# Patient Record
Sex: FEMALE | Race: WHITE | Employment: FULL TIME | URBAN - METROPOLITAN AREA
[De-identification: names, ages, dates, MRNs, and addresses within clinical notes are randomized per-mention and may not be internally consistent; named-entity substitution may affect disease eponyms.]

---

## 2017-03-15 PROBLEM — G89.29 CHRONIC BILATERAL LOW BACK PAIN: Status: ACTIVE | Noted: 2017-03-15

## 2017-03-15 PROBLEM — M54.50 CHRONIC BILATERAL LOW BACK PAIN: Status: ACTIVE | Noted: 2017-03-15

## 2017-03-15 PROBLEM — M54.9 DORSAL PAIN: Status: ACTIVE | Noted: 2017-03-15

## 2017-03-17 PROBLEM — N20.1 URETEROLITHIASIS: Status: ACTIVE | Noted: 2017-03-17

## 2017-03-17 PROBLEM — R10.9 ACUTE RIGHT FLANK PAIN: Status: ACTIVE | Noted: 2017-03-17

## 2017-09-11 PROBLEM — R51.9 NEW ONSET HEADACHE: Status: ACTIVE | Noted: 2017-09-11

## 2018-07-02 DIAGNOSIS — F51.01 PRIMARY INSOMNIA: ICD-10-CM

## 2018-07-02 RX ORDER — ZOLPIDEM TARTRATE 10 MG/1
10 TABLET ORAL NIGHTLY PRN
Qty: 30 TABLET | Refills: 0 | Status: SHIPPED | OUTPATIENT
Start: 2018-07-02 | End: 2018-08-13 | Stop reason: SDUPTHER

## 2018-08-13 ENCOUNTER — OFFICE VISIT (OUTPATIENT)
Dept: FAMILY MEDICINE CLINIC | Age: 38
End: 2018-08-13
Payer: COMMERCIAL

## 2018-08-13 VITALS
HEIGHT: 61 IN | HEART RATE: 86 BPM | DIASTOLIC BLOOD PRESSURE: 78 MMHG | RESPIRATION RATE: 16 BRPM | WEIGHT: 172 LBS | SYSTOLIC BLOOD PRESSURE: 120 MMHG | OXYGEN SATURATION: 99 % | BODY MASS INDEX: 32.47 KG/M2 | TEMPERATURE: 98 F

## 2018-08-13 DIAGNOSIS — F51.01 PRIMARY INSOMNIA: ICD-10-CM

## 2018-08-13 PROCEDURE — G8417 CALC BMI ABV UP PARAM F/U: HCPCS | Performed by: NURSE PRACTITIONER

## 2018-08-13 PROCEDURE — 99213 OFFICE O/P EST LOW 20 MIN: CPT | Performed by: NURSE PRACTITIONER

## 2018-08-13 PROCEDURE — 1036F TOBACCO NON-USER: CPT | Performed by: NURSE PRACTITIONER

## 2018-08-13 PROCEDURE — G8427 DOCREV CUR MEDS BY ELIG CLIN: HCPCS | Performed by: NURSE PRACTITIONER

## 2018-08-13 RX ORDER — ZOLPIDEM TARTRATE 10 MG/1
10 TABLET ORAL NIGHTLY PRN
Qty: 30 TABLET | Refills: 0 | Status: SHIPPED | OUTPATIENT
Start: 2018-08-13 | End: 2018-08-13 | Stop reason: CLARIF

## 2018-08-13 RX ORDER — ZOLPIDEM TARTRATE 10 MG/1
10 TABLET ORAL NIGHTLY PRN
Qty: 30 TABLET | Refills: 0 | Status: SHIPPED | OUTPATIENT
Start: 2018-08-13 | End: 2018-10-04 | Stop reason: SDUPTHER

## 2018-08-13 ASSESSMENT — ENCOUNTER SYMPTOMS
DIARRHEA: 0
COUGH: 0
SHORTNESS OF BREATH: 0
NAUSEA: 0
CONSTIPATION: 1
VOMITING: 0
WHEEZING: 0

## 2018-08-13 NOTE — PROGRESS NOTES
Psychiatric: She has a normal mood and affect. Her behavior is normal.         Assessment/Plan:  Lorenzo Pierce was seen today for insomnia. Diagnoses and all orders for this visit:    Primary insomnia  -     Discontinue: zolpidem (AMBIEN) 10 MG tablet; Take 1 tablet by mouth nightly as needed for Sleep. .  -     zolpidem (AMBIEN) 10 MG tablet; Take 1 tablet by mouth nightly as needed for Sleep. .    -add melatonin to try to maintain sleep. If not effective, call office and alternative will be ordered      -  Controlled Substances Monitoring:     RX Monitoring 8/13/2018   Attestation The Prescription Monitoring Report for this patient was reviewed today. Documentation No signs of potential drug abuse or diversion identified.        Greater than 15  Minutes was spent with patient and more than 50% of the time was spent face to face counseling and educating regarding diagnoses

## 2018-09-02 ENCOUNTER — HOSPITAL ENCOUNTER (EMERGENCY)
Age: 38
Discharge: HOME | End: 2018-09-02
Payer: COMMERCIAL

## 2018-09-02 VITALS
WEIGHT: 170 LBS | SYSTOLIC BLOOD PRESSURE: 111 MMHG | RESPIRATION RATE: 16 BRPM | DIASTOLIC BLOOD PRESSURE: 75 MMHG | OXYGEN SATURATION: 96 % | TEMPERATURE: 98.6 F | BODY MASS INDEX: 32.12 KG/M2 | HEART RATE: 77 BPM

## 2018-09-02 DIAGNOSIS — L23.7 POISON IVY: Primary | ICD-10-CM

## 2018-09-02 PROCEDURE — 96372 THER/PROPH/DIAG INJ SC/IM: CPT

## 2018-09-02 PROCEDURE — 99212 OFFICE O/P EST SF 10 MIN: CPT

## 2018-09-02 PROCEDURE — 6360000002 HC RX W HCPCS: Performed by: NURSE PRACTITIONER

## 2018-09-02 RX ORDER — DEXAMETHASONE SODIUM PHOSPHATE 10 MG/ML
10 INJECTION, SOLUTION INTRAMUSCULAR; INTRAVENOUS ONCE
Status: COMPLETED | OUTPATIENT
Start: 2018-09-02 | End: 2018-09-02

## 2018-09-02 RX ORDER — METHYLPREDNISOLONE 4 MG/1
TABLET ORAL
Qty: 21 TABLET | Status: SHIPPED | OUTPATIENT
Start: 2018-09-02 | End: 2018-09-08

## 2018-09-02 RX ADMIN — DEXAMETHASONE SODIUM PHOSPHATE 10 MG: 10 INJECTION INTRAMUSCULAR; INTRAVENOUS at 09:15

## 2018-09-05 ENCOUNTER — CARE COORDINATION (OUTPATIENT)
Dept: CARE COORDINATION | Age: 38
End: 2018-09-05

## 2018-09-22 ENCOUNTER — HOSPITAL ENCOUNTER (EMERGENCY)
Age: 38
Discharge: HOME | End: 2018-09-22
Payer: COMMERCIAL

## 2018-09-22 ENCOUNTER — APPOINTMENT (OUTPATIENT)
Dept: CT IMAGING | Age: 38
End: 2018-09-22
Payer: COMMERCIAL

## 2018-09-22 VITALS
TEMPERATURE: 98.6 F | HEART RATE: 84 BPM | WEIGHT: 170 LBS | SYSTOLIC BLOOD PRESSURE: 113 MMHG | BODY MASS INDEX: 32.12 KG/M2 | OXYGEN SATURATION: 99 % | DIASTOLIC BLOOD PRESSURE: 72 MMHG | RESPIRATION RATE: 16 BRPM

## 2018-09-22 VITALS
HEART RATE: 71 BPM | HEIGHT: 61 IN | OXYGEN SATURATION: 98 % | WEIGHT: 165 LBS | DIASTOLIC BLOOD PRESSURE: 86 MMHG | TEMPERATURE: 97.9 F | SYSTOLIC BLOOD PRESSURE: 132 MMHG | RESPIRATION RATE: 17 BRPM | BODY MASS INDEX: 31.15 KG/M2

## 2018-09-22 DIAGNOSIS — R10.9 FLANK PAIN: Primary | ICD-10-CM

## 2018-09-22 DIAGNOSIS — M54.50 ACUTE RIGHT-SIDED LOW BACK PAIN WITHOUT SCIATICA: Primary | ICD-10-CM

## 2018-09-22 LAB
ANION GAP SERPL CALCULATED.3IONS-SCNC: 11 MMOL/L (ref 7–16)
BASOPHILS ABSOLUTE: 0.02 E9/L (ref 0–0.2)
BASOPHILS RELATIVE PERCENT: 0.3 % (ref 0–2)
BILIRUBIN URINE: NEGATIVE
BLOOD, URINE: NEGATIVE
BUN BLDV-MCNC: 9 MG/DL (ref 6–20)
CALCIUM SERPL-MCNC: 9.3 MG/DL (ref 8.6–10.2)
CHLORIDE BLD-SCNC: 104 MMOL/L (ref 98–107)
CLARITY: CLEAR
CO2: 28 MMOL/L (ref 22–29)
COLOR: YELLOW
CREAT SERPL-MCNC: 0.8 MG/DL (ref 0.5–1)
EOSINOPHILS ABSOLUTE: 0.14 E9/L (ref 0.05–0.5)
EOSINOPHILS RELATIVE PERCENT: 1.8 % (ref 0–6)
GFR AFRICAN AMERICAN: >60
GFR NON-AFRICAN AMERICAN: >60 ML/MIN/1.73
GLUCOSE BLD-MCNC: 108 MG/DL (ref 74–109)
GLUCOSE URINE: NEGATIVE MG/DL
HCG(URINE) PREGNANCY TEST: NEGATIVE
HCT VFR BLD CALC: 41.2 % (ref 34–48)
HEMOGLOBIN: 13.6 G/DL (ref 11.5–15.5)
IMMATURE GRANULOCYTES #: 0.03 E9/L
IMMATURE GRANULOCYTES %: 0.4 % (ref 0–5)
KETONES, URINE: NEGATIVE MG/DL
LACTIC ACID: 1 MMOL/L (ref 0.5–2.2)
LEUKOCYTE ESTERASE, URINE: NEGATIVE
LYMPHOCYTES ABSOLUTE: 2.58 E9/L (ref 1.5–4)
LYMPHOCYTES RELATIVE PERCENT: 33.7 % (ref 20–42)
MCH RBC QN AUTO: 30.8 PG (ref 26–35)
MCHC RBC AUTO-ENTMCNC: 33 % (ref 32–34.5)
MCV RBC AUTO: 93.4 FL (ref 80–99.9)
MONOCYTES ABSOLUTE: 0.55 E9/L (ref 0.1–0.95)
MONOCYTES RELATIVE PERCENT: 7.2 % (ref 2–12)
NEUTROPHILS ABSOLUTE: 4.33 E9/L (ref 1.8–7.3)
NEUTROPHILS RELATIVE PERCENT: 56.6 % (ref 43–80)
NITRITE, URINE: NEGATIVE
PDW BLD-RTO: 12.9 FL (ref 11.5–15)
PH UA: 6.5 (ref 5–9)
PLATELET # BLD: 253 E9/L (ref 130–450)
PMV BLD AUTO: 10.3 FL (ref 7–12)
POTASSIUM SERPL-SCNC: 4 MMOL/L (ref 3.5–5)
PROTEIN UA: NEGATIVE MG/DL
RBC # BLD: 4.41 E12/L (ref 3.5–5.5)
SODIUM BLD-SCNC: 143 MMOL/L (ref 132–146)
SPECIFIC GRAVITY UA: 1.02 (ref 1–1.03)
UROBILINOGEN, URINE: 0.2 E.U./DL
WBC # BLD: 7.7 E9/L (ref 4.5–11.5)

## 2018-09-22 PROCEDURE — 99212 OFFICE O/P EST SF 10 MIN: CPT

## 2018-09-22 PROCEDURE — 99284 EMERGENCY DEPT VISIT MOD MDM: CPT

## 2018-09-22 PROCEDURE — 83605 ASSAY OF LACTIC ACID: CPT

## 2018-09-22 PROCEDURE — 2580000003 HC RX 258: Performed by: PHYSICIAN ASSISTANT

## 2018-09-22 PROCEDURE — 81003 URINALYSIS AUTO W/O SCOPE: CPT

## 2018-09-22 PROCEDURE — 87088 URINE BACTERIA CULTURE: CPT

## 2018-09-22 PROCEDURE — 6360000002 HC RX W HCPCS: Performed by: PHYSICIAN ASSISTANT

## 2018-09-22 PROCEDURE — 96374 THER/PROPH/DIAG INJ IV PUSH: CPT

## 2018-09-22 PROCEDURE — 74176 CT ABD & PELVIS W/O CONTRAST: CPT

## 2018-09-22 PROCEDURE — 80048 BASIC METABOLIC PNL TOTAL CA: CPT

## 2018-09-22 PROCEDURE — 81025 URINE PREGNANCY TEST: CPT

## 2018-09-22 PROCEDURE — 96375 TX/PRO/DX INJ NEW DRUG ADDON: CPT

## 2018-09-22 PROCEDURE — 85025 COMPLETE CBC W/AUTO DIFF WBC: CPT

## 2018-09-22 PROCEDURE — 36415 COLL VENOUS BLD VENIPUNCTURE: CPT

## 2018-09-22 RX ORDER — 0.9 % SODIUM CHLORIDE 0.9 %
1000 INTRAVENOUS SOLUTION INTRAVENOUS ONCE
Status: COMPLETED | OUTPATIENT
Start: 2018-09-22 | End: 2018-09-22

## 2018-09-22 RX ORDER — NAPROXEN 500 MG/1
500 TABLET ORAL 2 TIMES DAILY
Qty: 14 TABLET | Refills: 0 | Status: SHIPPED | OUTPATIENT
Start: 2018-09-22 | End: 2019-01-11 | Stop reason: ALTCHOICE

## 2018-09-22 RX ORDER — KETOROLAC TROMETHAMINE 30 MG/ML
30 INJECTION, SOLUTION INTRAMUSCULAR; INTRAVENOUS ONCE
Status: COMPLETED | OUTPATIENT
Start: 2018-09-22 | End: 2018-09-22

## 2018-09-22 RX ORDER — METHYLPREDNISOLONE 4 MG/1
TABLET ORAL
Qty: 21 TABLET | Status: SHIPPED | OUTPATIENT
Start: 2018-09-22 | End: 2018-09-28

## 2018-09-22 RX ORDER — CYCLOBENZAPRINE HCL 10 MG
10 TABLET ORAL 3 TIMES DAILY PRN
Qty: 21 TABLET | Refills: 0 | Status: SHIPPED | OUTPATIENT
Start: 2018-09-22 | End: 2018-10-02

## 2018-09-22 RX ORDER — ONDANSETRON 2 MG/ML
4 INJECTION INTRAMUSCULAR; INTRAVENOUS ONCE
Status: COMPLETED | OUTPATIENT
Start: 2018-09-22 | End: 2018-09-22

## 2018-09-22 RX ADMIN — ONDANSETRON HYDROCHLORIDE 4 MG: 2 INJECTION, SOLUTION INTRAMUSCULAR; INTRAVENOUS at 21:54

## 2018-09-22 RX ADMIN — SODIUM CHLORIDE 1000 ML: 9 INJECTION, SOLUTION INTRAVENOUS at 21:54

## 2018-09-22 RX ADMIN — KETOROLAC TROMETHAMINE 30 MG: 30 INJECTION, SOLUTION INTRAMUSCULAR; INTRAVENOUS at 21:54

## 2018-09-22 ASSESSMENT — PAIN DESCRIPTION - PAIN TYPE: TYPE: CHRONIC PAIN;ACUTE PAIN

## 2018-09-22 ASSESSMENT — PAIN DESCRIPTION - ORIENTATION: ORIENTATION: RIGHT

## 2018-09-22 ASSESSMENT — PAIN SCALES - GENERAL
PAINLEVEL_OUTOF10: 7

## 2018-09-22 ASSESSMENT — PAIN DESCRIPTION - LOCATION: LOCATION: BACK

## 2018-09-22 ASSESSMENT — PAIN DESCRIPTION - DESCRIPTORS: DESCRIPTORS: ACHING

## 2018-09-23 NOTE — ED PROVIDER NOTES
Patient very uncomfortable here we spoke about options of what to do. Patient looking for more definitive answer answer to her problem Patient  decided go to the ER for further evaluation of this problem she'll go by private vehicle. We will do a urine here but ultimately she needs worked up for a possible kidney stone.    --------------------------------------------- PAST HISTORY ---------------------------------------------  Past Medical History:  has a past medical history of Anxiety; Kidney stone; and New onset headache. Past Surgical History:  has a past surgical history that includes Tonsillectomy; Cystoscopy (12/12/2016); and Lithotripsy. Social History:  reports that she has never smoked. She has never used smokeless tobacco. She reports that she does not drink alcohol or use drugs. Family History: family history is not on file. The patients home medications have been reviewed. Allergies: Codeine    -------------------------------------------------- RESULTS -------------------------------------------------  No results found for this visit on 09/22/18. No orders to display       ------------------------- NURSING NOTES AND VITALS REVIEWED ---------------------------   The nursing notes within the ED encounter and vital signs as below have been reviewed. /72   Pulse 84   Temp 98.6 °F (37 °C)   Resp 16   Wt 170 lb (77.1 kg)   LMP 09/06/2018   SpO2 99%   BMI 32.12 kg/m²   Oxygen Saturation Interpretation: Normal      ------------------------------------------ PROGRESS NOTES ------------------------------------------   I have spoken with the patient and discussed todays results, in addition to providing specific details for the plan of care and counseling regarding the diagnosis and prognosis.   Their questions are answered at this time and they are agreeable with the plan.      --------------------------------- ADDITIONAL PROVIDER NOTES ---------------------------------      This patient is stable for discharge. I have shared the specific conditions for return, as well as the importance of follow-up. * NOTE: This report was transcribed using voice recognition software. Every effort was made to ensure accuracy; however, inadvertent computerized transcription errors may be present.    --------------------------------- IMPRESSION AND DISPOSITION ---------------------------------    IMPRESSION  1.  Flank pain        DISPOSITION  Disposition: Discharge to emergency department  Patient condition is stable         Cindy Pro PA-C  09/22/18 2021

## 2018-09-23 NOTE — ED PROVIDER NOTES
sounds. Distension:  None. Tenderness: No abdominal tenderness. Liver: non-tender. Spleen:  non-tender. Pulsatile Mass: absent. Back: CVA Tenderness: Yes, Right. Integument:  Normal turgor. Warm, dry, without visible rash, unless noted elsewhere. Lymphatics: No lymphangitis or adenopathy noted. Neurological:  Oriented. Motor functions intact. Lab / Imaging Results   (All laboratory and radiology results have been personally reviewed by myself)  Labs:  Results for orders placed or performed during the hospital encounter of 09/22/18   CBC Auto Differential   Result Value Ref Range    WBC 7.7 4.5 - 11.5 E9/L    RBC 4.41 3.50 - 5.50 E12/L    Hemoglobin 13.6 11.5 - 15.5 g/dL    Hematocrit 41.2 34.0 - 48.0 %    MCV 93.4 80.0 - 99.9 fL    MCH 30.8 26.0 - 35.0 pg    MCHC 33.0 32.0 - 34.5 %    RDW 12.9 11.5 - 15.0 fL    Platelets 598 930 - 720 E9/L    MPV 10.3 7.0 - 12.0 fL    Neutrophils % 56.6 43.0 - 80.0 %    Immature Granulocytes % 0.4 0.0 - 5.0 %    Lymphocytes % 33.7 20.0 - 42.0 %    Monocytes % 7.2 2.0 - 12.0 %    Eosinophils % 1.8 0.0 - 6.0 %    Basophils % 0.3 0.0 - 2.0 %    Neutrophils # 4.33 1.80 - 7.30 E9/L    Immature Granulocytes # 0.03 E9/L    Lymphocytes # 2.58 1.50 - 4.00 E9/L    Monocytes # 0.55 0.10 - 0.95 E9/L    Eosinophils # 0.14 0.05 - 0.50 E9/L    Basophils # 0.02 0.00 - 0.20 Q0/G   Basic Metabolic Panel   Result Value Ref Range    Sodium 143 132 - 146 mmol/L    Potassium 4.0 3.5 - 5.0 mmol/L    Chloride 104 98 - 107 mmol/L    CO2 28 22 - 29 mmol/L    Anion Gap 11 7 - 16 mmol/L    Glucose 108 74 - 109 mg/dL    BUN 9 6 - 20 mg/dL    CREATININE 0.8 0.5 - 1.0 mg/dL    GFR Non-African American >60 >=60 mL/min/1.73    GFR African American >60     Calcium 9.3 8.6 - 10.2 mg/dL   Lactic Acid, Plasma   Result Value Ref Range    Lactic Acid 1.0 0.5 - 2.2 mmol/L     Imaging:   All Radiology results interpreted by Radiologist unless otherwise noted.  CT ABDOMEN PELVIS WO CONTRAST Additional Contrast? None   Final Result   1. There are no findings of obstructive uropathy. 2. Unremarkable unenhanced CT scan of the abdomen. Specifically, there   is no appendicitis or acute cholecystitis. ED Course / Medical Decision Making     Medications   0.9 % sodium chloride bolus (1,000 mLs Intravenous New Bag 9/22/18 2154)   ondansetron (ZOFRAN) injection 4 mg (4 mg Intravenous Given 9/22/18 2154)   ketorolac (TORADOL) injection 30 mg (30 mg Intravenous Given 9/22/18 2154)      Re-examination:  9/22/18       Time: 2230   Patient's symptoms are improving. Consult(s):   None    Procedure(s):   none. MDM:   Patient presents to the emergency department for right-sided low back pain with concerns of kidney stone. Her diagnostic workup is completely unremarkable for acute abnormalities. Vital signs stable. She is well appearing during my reexamination and appropriate for discharge and outpatient follow-up. Specific conditions for emergent return have been discussed and the patient verbalized understanding to return immediately for new or worsening symptoms. Counseling: The emergency provider has spoken with the patient and discussed todays results, in addition to providing specific details for the plan of care and counseling regarding the diagnosis and prognosis. Questions are answered at this time and they are agreeable with the plan. Assessment      1.  Acute right-sided low back pain without sciatica       Plan   Discharge to home  Patient condition is good    New Medications     New Prescriptions    CYCLOBENZAPRINE (FLEXERIL) 10 MG TABLET    Take 1 tablet by mouth 3 times daily as needed for Muscle spasms    METHYLPREDNISOLONE (MEDROL, AMOR,) 4 MG TABLET    Take as directed on package insert days 1-6    NAPROXEN (NAPROSYN) 500 MG TABLET    Take 1 tablet by mouth 2 times daily for 7 days     Electronically signed by Abby Travis PA-C   DD: 9/22/18  **This report was transcribed using voice recognition software. Every effort was made to ensure accuracy; however, inadvertent computerized transcription errors may be present. END OF ED PROVIDER NOTE       Zaynab Valdivia PA-C  09/22/18 [id number]    ATTENDING PROVIDER ATTESTATION:     Supervising Physician, on-site, available for consultation, non-participatory in the evaluation or care of this patient.          Nakul Davis DO  09/23/18 1459

## 2018-09-25 ENCOUNTER — CARE COORDINATION (OUTPATIENT)
Dept: CARE COORDINATION | Age: 38
End: 2018-09-25

## 2018-09-26 LAB — URINE CULTURE, ROUTINE: NORMAL

## 2018-10-04 DIAGNOSIS — F51.01 PRIMARY INSOMNIA: ICD-10-CM

## 2018-10-04 RX ORDER — ZOLPIDEM TARTRATE 10 MG/1
10 TABLET ORAL NIGHTLY PRN
Qty: 30 TABLET | Refills: 0 | Status: SHIPPED | OUTPATIENT
Start: 2018-10-04 | End: 2018-12-10 | Stop reason: SDUPTHER

## 2018-12-10 ENCOUNTER — HOSPITAL ENCOUNTER (OUTPATIENT)
Dept: GENERAL RADIOLOGY | Age: 38
Discharge: HOME | End: 2018-12-12
Payer: COMMERCIAL

## 2018-12-10 ENCOUNTER — HOSPITAL ENCOUNTER (OUTPATIENT)
Age: 38
Discharge: HOME | End: 2018-12-12
Payer: COMMERCIAL

## 2018-12-10 ENCOUNTER — OFFICE VISIT (OUTPATIENT)
Dept: FAMILY MEDICINE CLINIC | Age: 38
End: 2018-12-10
Payer: COMMERCIAL

## 2018-12-10 VITALS
DIASTOLIC BLOOD PRESSURE: 72 MMHG | TEMPERATURE: 98 F | WEIGHT: 173 LBS | SYSTOLIC BLOOD PRESSURE: 108 MMHG | BODY MASS INDEX: 32.66 KG/M2 | HEART RATE: 94 BPM | OXYGEN SATURATION: 99 % | RESPIRATION RATE: 16 BRPM | HEIGHT: 61 IN

## 2018-12-10 DIAGNOSIS — F51.01 PRIMARY INSOMNIA: ICD-10-CM

## 2018-12-10 DIAGNOSIS — M79.672 HEEL PAIN, BILATERAL: ICD-10-CM

## 2018-12-10 DIAGNOSIS — R53.83 FATIGUE, UNSPECIFIED TYPE: ICD-10-CM

## 2018-12-10 DIAGNOSIS — M79.671 HEEL PAIN, BILATERAL: ICD-10-CM

## 2018-12-10 DIAGNOSIS — Z00.00 ROUTINE GENERAL MEDICAL EXAMINATION AT A HEALTH CARE FACILITY: Primary | ICD-10-CM

## 2018-12-10 DIAGNOSIS — Z00.00 ROUTINE GENERAL MEDICAL EXAMINATION AT A HEALTH CARE FACILITY: ICD-10-CM

## 2018-12-10 LAB
ALBUMIN SERPL-MCNC: 4.3 G/DL (ref 3.5–5.2)
ALP BLD-CCNC: 85 U/L (ref 35–104)
ALT SERPL-CCNC: 29 U/L (ref 0–32)
ANION GAP SERPL CALCULATED.3IONS-SCNC: 15 MMOL/L (ref 7–16)
AST SERPL-CCNC: 23 U/L (ref 0–31)
BASOPHILS ABSOLUTE: 0.04 E9/L (ref 0–0.2)
BASOPHILS RELATIVE PERCENT: 0.5 % (ref 0–2)
BILIRUB SERPL-MCNC: 0.4 MG/DL (ref 0–1.2)
BUN BLDV-MCNC: 9 MG/DL (ref 6–20)
CALCIUM SERPL-MCNC: 9.3 MG/DL (ref 8.6–10.2)
CHLORIDE BLD-SCNC: 101 MMOL/L (ref 98–107)
CO2: 24 MMOL/L (ref 22–29)
CREAT SERPL-MCNC: 0.8 MG/DL (ref 0.5–1)
EOSINOPHILS ABSOLUTE: 0.15 E9/L (ref 0.05–0.5)
EOSINOPHILS RELATIVE PERCENT: 2.1 % (ref 0–6)
GFR AFRICAN AMERICAN: >60
GFR NON-AFRICAN AMERICAN: >60 ML/MIN/1.73
GLUCOSE BLD-MCNC: 89 MG/DL (ref 74–99)
HCT VFR BLD CALC: 42.9 % (ref 34–48)
HEMOGLOBIN: 14 G/DL (ref 11.5–15.5)
IMMATURE GRANULOCYTES #: 0.03 E9/L
IMMATURE GRANULOCYTES %: 0.4 % (ref 0–5)
LYMPHOCYTES ABSOLUTE: 2.18 E9/L (ref 1.5–4)
LYMPHOCYTES RELATIVE PERCENT: 29.9 % (ref 20–42)
MCH RBC QN AUTO: 30.9 PG (ref 26–35)
MCHC RBC AUTO-ENTMCNC: 32.6 % (ref 32–34.5)
MCV RBC AUTO: 94.7 FL (ref 80–99.9)
MONOCYTES ABSOLUTE: 0.46 E9/L (ref 0.1–0.95)
MONOCYTES RELATIVE PERCENT: 6.3 % (ref 2–12)
NEUTROPHILS ABSOLUTE: 4.44 E9/L (ref 1.8–7.3)
NEUTROPHILS RELATIVE PERCENT: 60.8 % (ref 43–80)
PDW BLD-RTO: 12.7 FL (ref 11.5–15)
PLATELET # BLD: 318 E9/L (ref 130–450)
PMV BLD AUTO: 11.1 FL (ref 7–12)
POTASSIUM SERPL-SCNC: 4.9 MMOL/L (ref 3.5–5)
RBC # BLD: 4.53 E12/L (ref 3.5–5.5)
SODIUM BLD-SCNC: 140 MMOL/L (ref 132–146)
TOTAL PROTEIN: 6.9 G/DL (ref 6.4–8.3)
TSH SERPL DL<=0.05 MIU/L-ACNC: 2.73 UIU/ML (ref 0.27–4.2)
WBC # BLD: 7.3 E9/L (ref 4.5–11.5)

## 2018-12-10 PROCEDURE — 84443 ASSAY THYROID STIM HORMONE: CPT

## 2018-12-10 PROCEDURE — 99395 PREV VISIT EST AGE 18-39: CPT | Performed by: NURSE PRACTITIONER

## 2018-12-10 PROCEDURE — 80053 COMPREHEN METABOLIC PANEL: CPT

## 2018-12-10 PROCEDURE — 73630 X-RAY EXAM OF FOOT: CPT

## 2018-12-10 PROCEDURE — 85025 COMPLETE CBC W/AUTO DIFF WBC: CPT

## 2018-12-10 PROCEDURE — G8484 FLU IMMUNIZE NO ADMIN: HCPCS | Performed by: NURSE PRACTITIONER

## 2018-12-10 RX ORDER — ZOLPIDEM TARTRATE 10 MG/1
10 TABLET ORAL NIGHTLY PRN
Qty: 30 TABLET | Refills: 0 | Status: SHIPPED | OUTPATIENT
Start: 2018-12-10 | End: 2019-12-11 | Stop reason: SDUPTHER

## 2018-12-10 ASSESSMENT — PATIENT HEALTH QUESTIONNAIRE - PHQ9
SUM OF ALL RESPONSES TO PHQ9 QUESTIONS 1 & 2: 0
SUM OF ALL RESPONSES TO PHQ QUESTIONS 1-9: 0
2. FEELING DOWN, DEPRESSED OR HOPELESS: 0
1. LITTLE INTEREST OR PLEASURE IN DOING THINGS: 0
SUM OF ALL RESPONSES TO PHQ QUESTIONS 1-9: 0

## 2018-12-10 NOTE — PROGRESS NOTES
or flank pain. Musculoskeletal:neck pain, bilateral heel pain Denies arthralgias, joint swelling, stiff joints, muscle weakness and bone pain  Breasts: Denies breast lump, breast tenderness, changed mole, dryness, nipple discharge, pruritus, rash and skin color change  Neurological: Denies dizzy/vertigo, headache, focal weakness, numbness/tingling, speech problems, loss of consciousness, confusion, memory loss  Psychological:anxiety Denies depression, physical abuse, sexual abuse  Endocrine:  Denies heat intolerance, cold intolerance, tremor, weight changes, change in bowel habits and hair loss  Hematopoietic/lymphatic: Denies bleeding problems, blood transfusions, bruising, jaundice, pallor and swollen lymph nodes    Physical Examination      /72   Pulse 94   Temp 98 °F (36.7 °C)   Resp 16   Ht 5' 1\" (1.549 m)   Wt 173 lb (78.5 kg)   LMP 12/03/2018 (Exact Date)   SpO2 99%   Breastfeeding?  No   BMI 32.69 kg/m²     PHYSICAL EXAMINATION:  Constitutional: Appears well, no distress  EENT: PERRLA, EOMI, sclera clear, anicteric, oropharynx clear, no lesions and neck supple with midline trachea  Neck: Supple, symmetrical, trachea midline, no adenopathy, thyroid symmetric, not enlarged and no tenderness, skin normal  Respiratory: clear to auscultation, no wheezes or rales and unlabored breathing  Cardiovascular: regular rate and rhythm, normal S1, S2, no murmur noted, 2+ pulses throughout and capillary Refill less than 2 seconds  Pulses:  dorsalis pedis, posterior tibial and radial pulses 2+ bilaterally  Abdomen: soft, nontender, nondistended, no masses or organomegaly  Lymphatic: no palpable nodes  Musculoskeletal: tenderness to cervical spine and paraspinal muscles, ROM limited due to pain, bilateral heels are tender, no pain over plantar surface  Extremities:  peripheral pulses normal, no pedal edema, no clubbing or cyanosis  Skin:  warm and dry, tan raised lesion to low back with tiny darker spot in center, no surrounding erythema  Neurological/Psychiatric: cranial nerves 2-12 normal, gross motor exam normal by observation, gait normal       Dx: Z00.00       Plan:  Annual Screening Labs    -Casey alex was seen today for annual exam.    Diagnoses and all orders for this visit:    Routine general medical examination at a health care facility  -     CBC Auto Differential; Future  -     Comprehensive Metabolic Panel; Future  -     TSH without Reflex; Future    Primary insomnia  -     zolpidem (AMBIEN) 10 MG tablet; Take 1 tablet by mouth nightly as needed for Sleep. .  -The current medical regimen is effective;  continue present plan and medications. Heel pain, bilateral  -     XR FOOT LEFT (MIN 3 VIEWS); Future  -     XR FOOT RIGHT (MIN 3 VIEWS); Future  -will self refer to Dr. Viki Norris    Fatigue, unspecified type  -     CBC Auto Differential; Future  -     Comprehensive Metabolic Panel; Future  -     TSH without Reflex; Future    -monitor for any changes to lesion on back   . Educational materials and/or home exercises printed for patient's review and were included in patient instructions on his/her After Visit Summary and given to patient at the end of visit. Counseled regarding above diagnosis, including possible risks and complications,  especially if left uncontrolled. Counseled regarding the possible side effects, risks, benefits and alternatives to any treatment. Advised patient to call with any new medication issues, and read all Rx info from pharmacy to assure aware of all possible risks and side effects of medication before taking. Reviewed age and gender appropriate health screening exams and vaccinations. Advised patient regarding importance of keeping up with recommended health maintenance and to schedule as soon as possible if overdue, as this is important in assessing for undiagnosed pathology, especially cancer, as well as protecting against potentially harmful/life threatening disease.

## 2019-01-11 ENCOUNTER — HOSPITAL ENCOUNTER (EMERGENCY)
Age: 39
Discharge: HOME | End: 2019-01-11
Payer: OTHER MISCELLANEOUS

## 2019-01-11 ENCOUNTER — APPOINTMENT (OUTPATIENT)
Dept: GENERAL RADIOLOGY | Age: 39
End: 2019-01-11
Payer: OTHER MISCELLANEOUS

## 2019-01-11 VITALS
TEMPERATURE: 98.1 F | BODY MASS INDEX: 31.15 KG/M2 | HEIGHT: 61 IN | OXYGEN SATURATION: 99 % | WEIGHT: 165 LBS | RESPIRATION RATE: 18 BRPM | SYSTOLIC BLOOD PRESSURE: 104 MMHG | DIASTOLIC BLOOD PRESSURE: 65 MMHG | HEART RATE: 87 BPM
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
## 2019-01-11 DIAGNOSIS — S16.1XXA STRAIN OF NECK MUSCLE, INITIAL ENCOUNTER: Primary | ICD-10-CM

## 2019-01-11 PROCEDURE — 72050 X-RAY EXAM NECK SPINE 4/5VWS: CPT

## 2019-01-11 PROCEDURE — 99212 OFFICE O/P EST SF 10 MIN: CPT

## 2019-01-11 PROCEDURE — 71046 X-RAY EXAM CHEST 2 VIEWS: CPT

## 2019-01-11 RX ORDER — NAPROXEN 500 MG/1
500 TABLET ORAL 2 TIMES DAILY PRN
Qty: 14 TABLET | Refills: 0 | Status: SHIPPED | OUTPATIENT
Start: 2019-01-11 | End: 2019-09-17

## 2019-01-11 ASSESSMENT — PAIN DESCRIPTION - FREQUENCY: FREQUENCY: CONTINUOUS

## 2019-01-11 ASSESSMENT — PAIN DESCRIPTION - PAIN TYPE: TYPE: ACUTE PAIN

## 2019-01-11 ASSESSMENT — PAIN DESCRIPTION - PROGRESSION: CLINICAL_PROGRESSION: GRADUALLY WORSENING

## 2019-01-11 ASSESSMENT — PAIN DESCRIPTION - ONSET: ONSET: SUDDEN

## 2019-01-11 ASSESSMENT — PAIN DESCRIPTION - ORIENTATION: ORIENTATION: UPPER;MID

## 2019-01-11 ASSESSMENT — PAIN SCALES - GENERAL: PAINLEVEL_OUTOF10: 4

## 2019-01-11 ASSESSMENT — PAIN DESCRIPTION - DESCRIPTORS: DESCRIPTORS: ACHING

## 2019-01-11 ASSESSMENT — PAIN DESCRIPTION - LOCATION: LOCATION: BACK;NECK

## 2019-01-30 ENCOUNTER — OFFICE VISIT (OUTPATIENT)
Dept: FAMILY MEDICINE CLINIC | Age: 39
End: 2019-01-30
Payer: COMMERCIAL

## 2019-01-30 VITALS
WEIGHT: 177 LBS | TEMPERATURE: 98.9 F | HEIGHT: 61 IN | SYSTOLIC BLOOD PRESSURE: 118 MMHG | DIASTOLIC BLOOD PRESSURE: 70 MMHG | HEART RATE: 84 BPM | BODY MASS INDEX: 33.42 KG/M2 | RESPIRATION RATE: 14 BRPM | OXYGEN SATURATION: 99 %

## 2019-01-30 DIAGNOSIS — M54.2 NECK PAIN, ACUTE: Primary | ICD-10-CM

## 2019-01-30 DIAGNOSIS — G44.209 TENSION-TYPE HEADACHE, NOT INTRACTABLE, UNSPECIFIED CHRONICITY PATTERN: ICD-10-CM

## 2019-01-30 PROCEDURE — 1036F TOBACCO NON-USER: CPT | Performed by: NURSE PRACTITIONER

## 2019-01-30 PROCEDURE — G8427 DOCREV CUR MEDS BY ELIG CLIN: HCPCS | Performed by: NURSE PRACTITIONER

## 2019-01-30 PROCEDURE — G8417 CALC BMI ABV UP PARAM F/U: HCPCS | Performed by: NURSE PRACTITIONER

## 2019-01-30 PROCEDURE — G8484 FLU IMMUNIZE NO ADMIN: HCPCS | Performed by: NURSE PRACTITIONER

## 2019-01-30 PROCEDURE — 99213 OFFICE O/P EST LOW 20 MIN: CPT | Performed by: NURSE PRACTITIONER

## 2019-01-30 RX ORDER — CYCLOBENZAPRINE HCL 5 MG
5 TABLET ORAL 2 TIMES DAILY PRN
Qty: 30 TABLET | Refills: 0 | Status: SHIPPED | OUTPATIENT
Start: 2019-01-30 | End: 2019-02-09

## 2019-01-30 ASSESSMENT — ENCOUNTER SYMPTOMS
COUGH: 0
SHORTNESS OF BREATH: 0
CONSTIPATION: 1
DIARRHEA: 0
WHEEZING: 0
NAUSEA: 0
VOMITING: 0

## 2019-09-17 ENCOUNTER — HOSPITAL ENCOUNTER (EMERGENCY)
Age: 39
Discharge: HOME | End: 2019-09-17
Payer: COMMERCIAL

## 2019-09-17 ENCOUNTER — APPOINTMENT (OUTPATIENT)
Dept: GENERAL RADIOLOGY | Age: 39
End: 2019-09-17
Payer: COMMERCIAL

## 2019-09-17 VITALS
BODY MASS INDEX: 32.1 KG/M2 | WEIGHT: 170 LBS | SYSTOLIC BLOOD PRESSURE: 146 MMHG | OXYGEN SATURATION: 98 % | TEMPERATURE: 98.4 F | DIASTOLIC BLOOD PRESSURE: 83 MMHG | HEART RATE: 91 BPM | HEIGHT: 61 IN | RESPIRATION RATE: 16 BRPM

## 2019-09-17 DIAGNOSIS — S16.1XXA ACUTE STRAIN OF NECK MUSCLE, INITIAL ENCOUNTER: Primary | ICD-10-CM

## 2019-09-17 PROCEDURE — 72050 X-RAY EXAM NECK SPINE 4/5VWS: CPT

## 2019-09-17 PROCEDURE — 99283 EMERGENCY DEPT VISIT LOW MDM: CPT

## 2019-09-17 PROCEDURE — 6370000000 HC RX 637 (ALT 250 FOR IP): Performed by: NURSE PRACTITIONER

## 2019-09-17 RX ORDER — IBUPROFEN 800 MG/1
800 TABLET ORAL ONCE
Status: COMPLETED | OUTPATIENT
Start: 2019-09-17 | End: 2019-09-17

## 2019-09-17 RX ORDER — CYCLOBENZAPRINE HCL 10 MG
10 TABLET ORAL ONCE
Status: COMPLETED | OUTPATIENT
Start: 2019-09-17 | End: 2019-09-17

## 2019-09-17 RX ORDER — ACETAMINOPHEN 500 MG
1000 TABLET ORAL ONCE
Status: COMPLETED | OUTPATIENT
Start: 2019-09-17 | End: 2019-09-17

## 2019-09-17 RX ORDER — CYCLOBENZAPRINE HCL 10 MG
10 TABLET ORAL 3 TIMES DAILY PRN
Qty: 30 TABLET | Refills: 0 | Status: SHIPPED | OUTPATIENT
Start: 2019-09-17 | End: 2019-09-27

## 2019-09-17 RX ORDER — IBUPROFEN 800 MG/1
800 TABLET ORAL EVERY 6 HOURS PRN
Qty: 20 TABLET | Refills: 0 | Status: SHIPPED | OUTPATIENT
Start: 2019-09-17 | End: 2019-12-11

## 2019-09-17 RX ORDER — ACETAMINOPHEN 500 MG
1000 TABLET ORAL 4 TIMES DAILY PRN
Qty: 40 TABLET | Refills: 0 | Status: SHIPPED | OUTPATIENT
Start: 2019-09-17 | End: 2019-12-11

## 2019-09-17 RX ADMIN — ACETAMINOPHEN 1000 MG: 500 TABLET, FILM COATED ORAL at 16:24

## 2019-09-17 RX ADMIN — IBUPROFEN 800 MG: 800 TABLET, FILM COATED ORAL at 16:24

## 2019-09-17 RX ADMIN — CYCLOBENZAPRINE HYDROCHLORIDE 10 MG: 10 TABLET, FILM COATED ORAL at 16:24

## 2019-09-17 ASSESSMENT — PAIN DESCRIPTION - PAIN TYPE: TYPE: ACUTE PAIN

## 2019-09-17 ASSESSMENT — PAIN SCALES - GENERAL
PAINLEVEL_OUTOF10: 3
PAINLEVEL_OUTOF10: 5

## 2019-09-17 ASSESSMENT — PAIN DESCRIPTION - ONSET: ONSET: ON-GOING

## 2019-09-17 ASSESSMENT — PAIN DESCRIPTION - LOCATION: LOCATION: HEAD;NECK

## 2019-09-17 ASSESSMENT — PAIN DESCRIPTION - DESCRIPTORS: DESCRIPTORS: ACHING

## 2019-09-17 ASSESSMENT — PAIN DESCRIPTION - FREQUENCY: FREQUENCY: CONTINUOUS

## 2019-09-17 ASSESSMENT — PAIN DESCRIPTION - PROGRESSION: CLINICAL_PROGRESSION: GRADUALLY WORSENING

## 2019-12-11 ENCOUNTER — OFFICE VISIT (OUTPATIENT)
Dept: FAMILY MEDICINE CLINIC | Age: 39
End: 2019-12-11
Payer: COMMERCIAL

## 2019-12-11 ENCOUNTER — HOSPITAL ENCOUNTER (OUTPATIENT)
Age: 39
Discharge: HOME | End: 2019-12-13
Payer: COMMERCIAL

## 2019-12-11 VITALS
WEIGHT: 181 LBS | RESPIRATION RATE: 14 BRPM | BODY MASS INDEX: 35.53 KG/M2 | TEMPERATURE: 98.3 F | HEIGHT: 60 IN | DIASTOLIC BLOOD PRESSURE: 58 MMHG | SYSTOLIC BLOOD PRESSURE: 106 MMHG | HEART RATE: 91 BPM | OXYGEN SATURATION: 99 %

## 2019-12-11 DIAGNOSIS — E55.9 VITAMIN D DEFICIENCY: ICD-10-CM

## 2019-12-11 DIAGNOSIS — R31.9 HEMATURIA, UNSPECIFIED TYPE: ICD-10-CM

## 2019-12-11 DIAGNOSIS — R35.0 URINARY FREQUENCY: ICD-10-CM

## 2019-12-11 DIAGNOSIS — R53.83 FATIGUE, UNSPECIFIED TYPE: ICD-10-CM

## 2019-12-11 DIAGNOSIS — F51.01 PRIMARY INSOMNIA: ICD-10-CM

## 2019-12-11 DIAGNOSIS — Z00.00 ROUTINE GENERAL MEDICAL EXAMINATION AT A HEALTH CARE FACILITY: Primary | ICD-10-CM

## 2019-12-11 LAB
BILIRUBIN, POC: ABNORMAL
BLOOD URINE, POC: ABNORMAL
CLARITY, POC: CLEAR
COLOR, POC: YELLOW
GLUCOSE URINE, POC: ABNORMAL
KETONES, POC: ABNORMAL
LEUKOCYTE EST, POC: ABNORMAL
NITRITE, POC: ABNORMAL
PH, POC: 6.5
PROTEIN, POC: ABNORMAL
SPECIFIC GRAVITY, POC: 1.01
UROBILINOGEN, POC: 0.2

## 2019-12-11 PROCEDURE — 81002 URINALYSIS NONAUTO W/O SCOPE: CPT | Performed by: NURSE PRACTITIONER

## 2019-12-11 PROCEDURE — G8484 FLU IMMUNIZE NO ADMIN: HCPCS | Performed by: NURSE PRACTITIONER

## 2019-12-11 PROCEDURE — 87088 URINE BACTERIA CULTURE: CPT

## 2019-12-11 PROCEDURE — 99395 PREV VISIT EST AGE 18-39: CPT | Performed by: NURSE PRACTITIONER

## 2019-12-11 RX ORDER — ZOLPIDEM TARTRATE 10 MG/1
10 TABLET ORAL NIGHTLY PRN
Qty: 30 TABLET | Refills: 0 | Status: SHIPPED
Start: 2019-12-11 | End: 2020-02-12 | Stop reason: SDUPTHER

## 2019-12-11 ASSESSMENT — PATIENT HEALTH QUESTIONNAIRE - PHQ9
1. LITTLE INTEREST OR PLEASURE IN DOING THINGS: 0
SUM OF ALL RESPONSES TO PHQ9 QUESTIONS 1 & 2: 1
SUM OF ALL RESPONSES TO PHQ QUESTIONS 1-9: 1
2. FEELING DOWN, DEPRESSED OR HOPELESS: 1
SUM OF ALL RESPONSES TO PHQ QUESTIONS 1-9: 1

## 2019-12-13 ENCOUNTER — HOSPITAL ENCOUNTER (OUTPATIENT)
Age: 39
Discharge: HOME | End: 2019-12-15
Payer: COMMERCIAL

## 2019-12-13 DIAGNOSIS — Z00.00 ROUTINE GENERAL MEDICAL EXAMINATION AT A HEALTH CARE FACILITY: ICD-10-CM

## 2019-12-13 DIAGNOSIS — E55.9 VITAMIN D DEFICIENCY: ICD-10-CM

## 2019-12-13 DIAGNOSIS — R53.83 FATIGUE, UNSPECIFIED TYPE: ICD-10-CM

## 2019-12-13 LAB
ALBUMIN SERPL-MCNC: 4.4 G/DL (ref 3.5–5.2)
ALP BLD-CCNC: 85 U/L (ref 35–104)
ALT SERPL-CCNC: 17 U/L (ref 0–32)
ANION GAP SERPL CALCULATED.3IONS-SCNC: 10 MMOL/L (ref 7–16)
AST SERPL-CCNC: 17 U/L (ref 0–31)
BASOPHILS ABSOLUTE: 0.04 E9/L (ref 0–0.2)
BASOPHILS RELATIVE PERCENT: 0.5 % (ref 0–2)
BILIRUB SERPL-MCNC: 0.3 MG/DL (ref 0–1.2)
BUN BLDV-MCNC: 11 MG/DL (ref 6–20)
CALCIUM SERPL-MCNC: 9.7 MG/DL (ref 8.6–10.2)
CHLORIDE BLD-SCNC: 103 MMOL/L (ref 98–107)
CO2: 26 MMOL/L (ref 22–29)
CREAT SERPL-MCNC: 0.9 MG/DL (ref 0.5–1)
EOSINOPHILS ABSOLUTE: 0.08 E9/L (ref 0.05–0.5)
EOSINOPHILS RELATIVE PERCENT: 1.1 % (ref 0–6)
GFR AFRICAN AMERICAN: >60
GFR NON-AFRICAN AMERICAN: >60 ML/MIN/1.73
GLUCOSE BLD-MCNC: 96 MG/DL (ref 74–99)
HCT VFR BLD CALC: 44.3 % (ref 34–48)
HEMOGLOBIN: 13.8 G/DL (ref 11.5–15.5)
IMMATURE GRANULOCYTES #: 0.02 E9/L
IMMATURE GRANULOCYTES %: 0.3 % (ref 0–5)
LYMPHOCYTES ABSOLUTE: 1.86 E9/L (ref 1.5–4)
LYMPHOCYTES RELATIVE PERCENT: 24.8 % (ref 20–42)
MCH RBC QN AUTO: 30 PG (ref 26–35)
MCHC RBC AUTO-ENTMCNC: 31.2 % (ref 32–34.5)
MCV RBC AUTO: 96.3 FL (ref 80–99.9)
MONOCYTES ABSOLUTE: 0.48 E9/L (ref 0.1–0.95)
MONOCYTES RELATIVE PERCENT: 6.4 % (ref 2–12)
NEUTROPHILS ABSOLUTE: 5.03 E9/L (ref 1.8–7.3)
NEUTROPHILS RELATIVE PERCENT: 66.9 % (ref 43–80)
PDW BLD-RTO: 13.2 FL (ref 11.5–15)
PLATELET # BLD: 300 E9/L (ref 130–450)
PMV BLD AUTO: 11.3 FL (ref 7–12)
POTASSIUM SERPL-SCNC: 5.7 MMOL/L (ref 3.5–5)
RBC # BLD: 4.6 E12/L (ref 3.5–5.5)
SODIUM BLD-SCNC: 139 MMOL/L (ref 132–146)
TOTAL PROTEIN: 7.2 G/DL (ref 6.4–8.3)
TSH SERPL DL<=0.05 MIU/L-ACNC: 2.35 UIU/ML (ref 0.27–4.2)
URINE CULTURE, ROUTINE: NORMAL
VITAMIN D 25-HYDROXY: 23 NG/ML (ref 30–100)
WBC # BLD: 7.5 E9/L (ref 4.5–11.5)

## 2019-12-13 PROCEDURE — 36415 COLL VENOUS BLD VENIPUNCTURE: CPT

## 2019-12-13 PROCEDURE — 82306 VITAMIN D 25 HYDROXY: CPT

## 2019-12-13 PROCEDURE — 80053 COMPREHEN METABOLIC PANEL: CPT

## 2019-12-13 PROCEDURE — 84443 ASSAY THYROID STIM HORMONE: CPT

## 2019-12-13 PROCEDURE — 85025 COMPLETE CBC W/AUTO DIFF WBC: CPT

## 2019-12-15 ENCOUNTER — APPOINTMENT (OUTPATIENT)
Dept: GENERAL RADIOLOGY | Age: 39
End: 2019-12-15
Payer: COMMERCIAL

## 2019-12-15 ENCOUNTER — APPOINTMENT (OUTPATIENT)
Dept: ULTRASOUND IMAGING | Age: 39
End: 2019-12-15
Payer: COMMERCIAL

## 2019-12-15 ENCOUNTER — HOSPITAL ENCOUNTER (EMERGENCY)
Age: 39
Discharge: HOME | End: 2019-12-15
Attending: EMERGENCY MEDICINE
Payer: COMMERCIAL

## 2019-12-15 VITALS
RESPIRATION RATE: 16 BRPM | OXYGEN SATURATION: 98 % | HEIGHT: 60 IN | SYSTOLIC BLOOD PRESSURE: 125 MMHG | DIASTOLIC BLOOD PRESSURE: 80 MMHG | WEIGHT: 181 LBS | HEART RATE: 70 BPM | TEMPERATURE: 98.2 F | BODY MASS INDEX: 35.53 KG/M2

## 2019-12-15 DIAGNOSIS — N20.0 CALCULUS OF RIGHT KIDNEY: Primary | ICD-10-CM

## 2019-12-15 DIAGNOSIS — N94.9 ADNEXAL CYST: ICD-10-CM

## 2019-12-15 LAB
ANION GAP SERPL CALCULATED.3IONS-SCNC: 12 MMOL/L (ref 7–16)
BACTERIA: ABNORMAL /HPF
BASOPHILS ABSOLUTE: 0.04 E9/L (ref 0–0.2)
BASOPHILS RELATIVE PERCENT: 0.4 % (ref 0–2)
BILIRUBIN URINE: NEGATIVE
BLOOD, URINE: ABNORMAL
BUN BLDV-MCNC: 12 MG/DL (ref 6–20)
CALCIUM SERPL-MCNC: 9.6 MG/DL (ref 8.6–10.2)
CHLORIDE BLD-SCNC: 101 MMOL/L (ref 98–107)
CLARITY: ABNORMAL
CO2: 25 MMOL/L (ref 22–29)
COLOR: YELLOW
CREAT SERPL-MCNC: 0.9 MG/DL (ref 0.5–1)
EOSINOPHILS ABSOLUTE: 0.08 E9/L (ref 0.05–0.5)
EOSINOPHILS RELATIVE PERCENT: 0.8 % (ref 0–6)
EPITHELIAL CELLS, UA: ABNORMAL /HPF
GFR AFRICAN AMERICAN: >60
GFR NON-AFRICAN AMERICAN: >60 ML/MIN/1.73
GLUCOSE BLD-MCNC: 113 MG/DL (ref 74–99)
GLUCOSE URINE: NEGATIVE MG/DL
HCG, URINE, POC: NEGATIVE
HCT VFR BLD CALC: 42 % (ref 34–48)
HEMOGLOBIN: 14.1 G/DL (ref 11.5–15.5)
IMMATURE GRANULOCYTES #: 0.03 E9/L
IMMATURE GRANULOCYTES %: 0.3 % (ref 0–5)
KETONES, URINE: NEGATIVE MG/DL
LEUKOCYTE ESTERASE, URINE: NEGATIVE
LYMPHOCYTES ABSOLUTE: 2.08 E9/L (ref 1.5–4)
LYMPHOCYTES RELATIVE PERCENT: 19.7 % (ref 20–42)
Lab: NORMAL
MCH RBC QN AUTO: 31.2 PG (ref 26–35)
MCHC RBC AUTO-ENTMCNC: 33.6 % (ref 32–34.5)
MCV RBC AUTO: 92.9 FL (ref 80–99.9)
MONOCYTES ABSOLUTE: 0.57 E9/L (ref 0.1–0.95)
MONOCYTES RELATIVE PERCENT: 5.4 % (ref 2–12)
NEGATIVE QC PASS/FAIL: NORMAL
NEUTROPHILS ABSOLUTE: 7.77 E9/L (ref 1.8–7.3)
NEUTROPHILS RELATIVE PERCENT: 73.4 % (ref 43–80)
NITRITE, URINE: NEGATIVE
PDW BLD-RTO: 13 FL (ref 11.5–15)
PH UA: 6 (ref 5–9)
PLATELET # BLD: 313 E9/L (ref 130–450)
PMV BLD AUTO: 10.3 FL (ref 7–12)
POSITIVE QC PASS/FAIL: NORMAL
POTASSIUM SERPL-SCNC: 4.2 MMOL/L (ref 3.5–5)
PROTEIN UA: 30 MG/DL
RBC # BLD: 4.52 E12/L (ref 3.5–5.5)
RBC UA: ABNORMAL /HPF (ref 0–2)
SODIUM BLD-SCNC: 138 MMOL/L (ref 132–146)
SPECIFIC GRAVITY UA: >=1.03 (ref 1–1.03)
UROBILINOGEN, URINE: 0.2 E.U./DL
WBC # BLD: 10.6 E9/L (ref 4.5–11.5)
WBC UA: ABNORMAL /HPF (ref 0–5)

## 2019-12-15 PROCEDURE — 76775 US EXAM ABDO BACK WALL LIM: CPT

## 2019-12-15 PROCEDURE — 87088 URINE BACTERIA CULTURE: CPT

## 2019-12-15 PROCEDURE — 96375 TX/PRO/DX INJ NEW DRUG ADDON: CPT

## 2019-12-15 PROCEDURE — 80048 BASIC METABOLIC PNL TOTAL CA: CPT

## 2019-12-15 PROCEDURE — 6360000002 HC RX W HCPCS: Performed by: EMERGENCY MEDICINE

## 2019-12-15 PROCEDURE — 2580000003 HC RX 258: Performed by: EMERGENCY MEDICINE

## 2019-12-15 PROCEDURE — 96372 THER/PROPH/DIAG INJ SC/IM: CPT

## 2019-12-15 PROCEDURE — 85025 COMPLETE CBC W/AUTO DIFF WBC: CPT

## 2019-12-15 PROCEDURE — 74018 RADEX ABDOMEN 1 VIEW: CPT

## 2019-12-15 PROCEDURE — 96376 TX/PRO/DX INJ SAME DRUG ADON: CPT

## 2019-12-15 PROCEDURE — 99284 EMERGENCY DEPT VISIT MOD MDM: CPT

## 2019-12-15 PROCEDURE — 96374 THER/PROPH/DIAG INJ IV PUSH: CPT

## 2019-12-15 PROCEDURE — 36415 COLL VENOUS BLD VENIPUNCTURE: CPT

## 2019-12-15 PROCEDURE — 81001 URINALYSIS AUTO W/SCOPE: CPT

## 2019-12-15 RX ORDER — NAPROXEN 500 MG/1
500 TABLET ORAL 2 TIMES DAILY
Qty: 14 TABLET | Refills: 0 | Status: SHIPPED | OUTPATIENT
Start: 2019-12-15 | End: 2020-02-03

## 2019-12-15 RX ORDER — PROMETHAZINE HYDROCHLORIDE 25 MG/ML
25 INJECTION, SOLUTION INTRAMUSCULAR; INTRAVENOUS ONCE
Status: COMPLETED | OUTPATIENT
Start: 2019-12-15 | End: 2019-12-15

## 2019-12-15 RX ORDER — TAMSULOSIN HYDROCHLORIDE 0.4 MG/1
0.4 CAPSULE ORAL DAILY
Qty: 30 CAPSULE | Refills: 0 | Status: SHIPPED | OUTPATIENT
Start: 2019-12-15 | End: 2020-02-12

## 2019-12-15 RX ORDER — MORPHINE SULFATE 5 MG/ML
5 INJECTION, SOLUTION INTRAMUSCULAR; INTRAVENOUS ONCE
Status: COMPLETED | OUTPATIENT
Start: 2019-12-15 | End: 2019-12-15

## 2019-12-15 RX ORDER — MORPHINE SULFATE 10 MG/ML
INJECTION, SOLUTION INTRAMUSCULAR; INTRAVENOUS
Status: DISPENSED
Start: 2019-12-15 | End: 2019-12-16

## 2019-12-15 RX ORDER — ONDANSETRON 2 MG/ML
INJECTION INTRAMUSCULAR; INTRAVENOUS
Status: DISPENSED
Start: 2019-12-15 | End: 2019-12-16

## 2019-12-15 RX ORDER — MORPHINE SULFATE 10 MG/ML
8 INJECTION, SOLUTION INTRAMUSCULAR; INTRAVENOUS ONCE
Status: COMPLETED | OUTPATIENT
Start: 2019-12-15 | End: 2019-12-15

## 2019-12-15 RX ORDER — KETOROLAC TROMETHAMINE 15 MG/ML
15 INJECTION, SOLUTION INTRAMUSCULAR; INTRAVENOUS ONCE
Status: COMPLETED | OUTPATIENT
Start: 2019-12-15 | End: 2019-12-15

## 2019-12-15 RX ORDER — SODIUM CHLORIDE 0.9 % (FLUSH) 0.9 %
SYRINGE (ML) INJECTION
Status: DISPENSED
Start: 2019-12-15 | End: 2019-12-16

## 2019-12-15 RX ORDER — 0.9 % SODIUM CHLORIDE 0.9 %
1000 INTRAVENOUS SOLUTION INTRAVENOUS ONCE
Status: COMPLETED | OUTPATIENT
Start: 2019-12-15 | End: 2019-12-15

## 2019-12-15 RX ORDER — PHENAZOPYRIDINE HYDROCHLORIDE 100 MG/1
100 TABLET, FILM COATED ORAL 3 TIMES DAILY PRN
Qty: 9 TABLET | Refills: 0 | Status: SHIPPED | OUTPATIENT
Start: 2019-12-15 | End: 2019-12-18

## 2019-12-15 RX ORDER — HYDROCODONE BITARTRATE AND ACETAMINOPHEN 5; 325 MG/1; MG/1
1 TABLET ORAL EVERY 6 HOURS PRN
Qty: 12 TABLET | Refills: 0 | Status: SHIPPED | OUTPATIENT
Start: 2019-12-15 | End: 2019-12-18

## 2019-12-15 RX ORDER — SODIUM CHLORIDE 0.9 % (FLUSH) 0.9 %
10 SYRINGE (ML) INJECTION PRN
Status: DISCONTINUED | OUTPATIENT
Start: 2019-12-15 | End: 2019-12-15 | Stop reason: HOSPADM

## 2019-12-15 RX ORDER — MORPHINE SULFATE 4 MG/ML
INJECTION, SOLUTION INTRAMUSCULAR; INTRAVENOUS
Status: DISPENSED
Start: 2019-12-15 | End: 2019-12-16

## 2019-12-15 RX ORDER — ONDANSETRON 2 MG/ML
8 INJECTION INTRAMUSCULAR; INTRAVENOUS ONCE
Status: COMPLETED | OUTPATIENT
Start: 2019-12-15 | End: 2019-12-15

## 2019-12-15 RX ORDER — KETOROLAC TROMETHAMINE 15 MG/ML
INJECTION, SOLUTION INTRAMUSCULAR; INTRAVENOUS
Status: DISPENSED
Start: 2019-12-15 | End: 2019-12-16

## 2019-12-15 RX ORDER — ONDANSETRON 4 MG/1
4 TABLET, FILM COATED ORAL EVERY 8 HOURS PRN
Qty: 20 TABLET | Refills: 0 | Status: SHIPPED | OUTPATIENT
Start: 2019-12-15 | End: 2020-02-12 | Stop reason: SDUPTHER

## 2019-12-15 RX ADMIN — SODIUM CHLORIDE 1000 ML: 9 INJECTION, SOLUTION INTRAVENOUS at 14:55

## 2019-12-15 RX ADMIN — MORPHINE SULFATE 5 MG: 5 INJECTION, SOLUTION INTRAMUSCULAR; INTRAVENOUS at 12:15

## 2019-12-15 RX ADMIN — MORPHINE SULFATE 8 MG: 10 INJECTION, SOLUTION INTRAMUSCULAR; INTRAVENOUS at 14:55

## 2019-12-15 RX ADMIN — KETOROLAC TROMETHAMINE 15 MG: 15 INJECTION, SOLUTION INTRAMUSCULAR; INTRAVENOUS at 12:20

## 2019-12-15 RX ADMIN — ONDANSETRON HYDROCHLORIDE 8 MG: 2 INJECTION, SOLUTION INTRAMUSCULAR; INTRAVENOUS at 12:11

## 2019-12-15 RX ADMIN — PROMETHAZINE HYDROCHLORIDE 25 MG: 25 INJECTION INTRAMUSCULAR; INTRAVENOUS at 15:32

## 2019-12-15 RX ADMIN — SODIUM CHLORIDE 1000 ML: 9 INJECTION, SOLUTION INTRAVENOUS at 12:09

## 2019-12-15 ASSESSMENT — PAIN SCALES - GENERAL
PAINLEVEL_OUTOF10: 2
PAINLEVEL_OUTOF10: 4
PAINLEVEL_OUTOF10: 10

## 2019-12-15 ASSESSMENT — ENCOUNTER SYMPTOMS
EYE REDNESS: 0
DIARRHEA: 0
NAUSEA: 1
EYE DISCHARGE: 0
SINUS PRESSURE: 0
SHORTNESS OF BREATH: 0
EYE PAIN: 0
VOMITING: 1
BACK PAIN: 0
ABDOMINAL DISTENTION: 0
ABDOMINAL PAIN: 1
WHEEZING: 0
SORE THROAT: 0
COUGH: 0

## 2019-12-15 ASSESSMENT — PAIN DESCRIPTION - ORIENTATION: ORIENTATION: RIGHT

## 2019-12-15 ASSESSMENT — PAIN DESCRIPTION - LOCATION: LOCATION: ABDOMEN;FLANK

## 2019-12-15 ASSESSMENT — PAIN DESCRIPTION - PAIN TYPE: TYPE: ACUTE PAIN

## 2019-12-16 DIAGNOSIS — E87.5 HYPERKALEMIA: Primary | ICD-10-CM

## 2019-12-17 ENCOUNTER — HOSPITAL ENCOUNTER (OUTPATIENT)
Age: 39
Discharge: HOME | End: 2019-12-19
Payer: COMMERCIAL

## 2019-12-17 LAB — URINE CULTURE, ROUTINE: NORMAL

## 2019-12-17 PROCEDURE — 87088 URINE BACTERIA CULTURE: CPT

## 2019-12-19 LAB — URINE CULTURE, ROUTINE: NORMAL

## 2019-12-23 ENCOUNTER — HOSPITAL ENCOUNTER (OUTPATIENT)
Age: 39
Discharge: HOME | End: 2019-12-25

## 2019-12-23 PROCEDURE — 88300 SURGICAL PATH GROSS: CPT

## 2019-12-23 PROCEDURE — 82365 CALCULUS SPECTROSCOPY: CPT

## 2019-12-27 LAB
CALCULI COMPOSITION: NORMAL
MASS: 26 MG
STONE DESCRIPTION: NORMAL
STONE NUMBER: 1
STONE SIZE: NORMAL MM

## 2020-01-03 ENCOUNTER — TELEPHONE (OUTPATIENT)
Dept: FAMILY MEDICINE CLINIC | Age: 40
End: 2020-01-03

## 2020-01-03 NOTE — TELEPHONE ENCOUNTER
Betzy Phoenix called today to inquire whether she was getting a referral that was discussed at her last appointment. I told her I would check since there wasn't anything in her chart.     # [phone]

## 2020-02-03 ENCOUNTER — OFFICE VISIT (OUTPATIENT)
Dept: FAMILY MEDICINE CLINIC | Age: 40
End: 2020-02-03
Payer: COMMERCIAL

## 2020-02-03 VITALS
OXYGEN SATURATION: 98 % | RESPIRATION RATE: 14 BRPM | HEIGHT: 61 IN | TEMPERATURE: 98.7 F | HEART RATE: 77 BPM | WEIGHT: 179.8 LBS | SYSTOLIC BLOOD PRESSURE: 118 MMHG | DIASTOLIC BLOOD PRESSURE: 78 MMHG | BODY MASS INDEX: 33.95 KG/M2

## 2020-02-03 PROCEDURE — G8427 DOCREV CUR MEDS BY ELIG CLIN: HCPCS | Performed by: NURSE PRACTITIONER

## 2020-02-03 PROCEDURE — G8484 FLU IMMUNIZE NO ADMIN: HCPCS | Performed by: NURSE PRACTITIONER

## 2020-02-03 PROCEDURE — 99213 OFFICE O/P EST LOW 20 MIN: CPT | Performed by: NURSE PRACTITIONER

## 2020-02-03 PROCEDURE — G8417 CALC BMI ABV UP PARAM F/U: HCPCS | Performed by: NURSE PRACTITIONER

## 2020-02-03 PROCEDURE — 1036F TOBACCO NON-USER: CPT | Performed by: NURSE PRACTITIONER

## 2020-02-03 ASSESSMENT — ENCOUNTER SYMPTOMS
NAUSEA: 0
SHORTNESS OF BREATH: 0
VOMITING: 0
CONSTIPATION: 0
DIARRHEA: 0
COUGH: 0
WHEEZING: 0

## 2020-02-03 ASSESSMENT — PATIENT HEALTH QUESTIONNAIRE - PHQ9
SUM OF ALL RESPONSES TO PHQ QUESTIONS 1-9: 2
2. FEELING DOWN, DEPRESSED OR HOPELESS: 2
1. LITTLE INTEREST OR PLEASURE IN DOING THINGS: 0
SUM OF ALL RESPONSES TO PHQ9 QUESTIONS 1 & 2: 2
SUM OF ALL RESPONSES TO PHQ QUESTIONS 1-9: 2

## 2020-02-03 NOTE — PROGRESS NOTES
Lymphadenopathy:      Cervical: No cervical adenopathy. Skin:     General: Skin is warm and dry. Neurological:      Mental Status: She is alert and oriented to person, place, and time. Psychiatric:         Behavior: Behavior normal.           Assessment/Plan:  Herbie Evangelista was seen today for results.     Diagnoses and all orders for this visit:    Increased endometrial stripe thickness  -US was completed day 14 of menstrual cycle, borderline thick  -asymptomatic  -ovarian cyst resolved        Greater than 15  Minutes was spent with patient and more than 50% of the time was spent face to facecounseling and educating regarding diagnoses

## 2020-02-12 ENCOUNTER — OFFICE VISIT (OUTPATIENT)
Dept: FAMILY MEDICINE CLINIC | Age: 40
End: 2020-02-12
Payer: COMMERCIAL

## 2020-02-12 VITALS
HEART RATE: 77 BPM | RESPIRATION RATE: 14 BRPM | SYSTOLIC BLOOD PRESSURE: 118 MMHG | DIASTOLIC BLOOD PRESSURE: 68 MMHG | OXYGEN SATURATION: 98 % | HEIGHT: 61 IN | TEMPERATURE: 98.1 F | BODY MASS INDEX: 33.99 KG/M2 | WEIGHT: 180 LBS

## 2020-02-12 PROCEDURE — 99213 OFFICE O/P EST LOW 20 MIN: CPT | Performed by: NURSE PRACTITIONER

## 2020-02-12 PROCEDURE — G8417 CALC BMI ABV UP PARAM F/U: HCPCS | Performed by: NURSE PRACTITIONER

## 2020-02-12 PROCEDURE — G8484 FLU IMMUNIZE NO ADMIN: HCPCS | Performed by: NURSE PRACTITIONER

## 2020-02-12 PROCEDURE — G8427 DOCREV CUR MEDS BY ELIG CLIN: HCPCS | Performed by: NURSE PRACTITIONER

## 2020-02-12 PROCEDURE — 1036F TOBACCO NON-USER: CPT | Performed by: NURSE PRACTITIONER

## 2020-02-12 RX ORDER — ONDANSETRON 4 MG/1
4 TABLET, FILM COATED ORAL EVERY 8 HOURS PRN
Qty: 20 TABLET | Refills: 0 | Status: SHIPPED
Start: 2020-02-12 | End: 2020-07-14 | Stop reason: SDUPTHER

## 2020-02-12 RX ORDER — BUSPIRONE HYDROCHLORIDE 7.5 MG/1
7.5 TABLET ORAL 2 TIMES DAILY PRN
Qty: 60 TABLET | Refills: 0 | Status: SHIPPED | OUTPATIENT
Start: 2020-02-12 | End: 2020-03-13

## 2020-02-12 RX ORDER — ZOLPIDEM TARTRATE 10 MG/1
10 TABLET ORAL NIGHTLY PRN
Qty: 30 TABLET | Refills: 0 | Status: SHIPPED
Start: 2020-02-12 | End: 2020-04-17 | Stop reason: SDUPTHER

## 2020-02-12 ASSESSMENT — ENCOUNTER SYMPTOMS
CONSTIPATION: 1
SHORTNESS OF BREATH: 0
WHEEZING: 0
DIARRHEA: 0
NAUSEA: 1
VOMITING: 0
COUGH: 0

## 2020-02-12 NOTE — PROGRESS NOTES
Temp 98.1 °F (36.7 °C) (Oral)   Resp 14   Ht 5' 1\" (1.549 m)   Wt 180 lb (81.6 kg)   LMP 02/08/2020 (Approximate)   SpO2 98%   BMI 34.01 kg/m²     Physical Exam  Physical Exam  Constitutional:       General: She is not in acute distress. Appearance: She is well-developed. HENT:      Head: Normocephalic and atraumatic. Neck:      Thyroid: No thyromegaly. Trachea: No tracheal deviation. Cardiovascular:      Rate and Rhythm: Normal rate and regular rhythm. Heart sounds: No murmur. Pulmonary:      Effort: Pulmonary effort is normal.      Breath sounds: Normal breath sounds. No wheezing or rales. Chest:      Chest wall: No tenderness. Abdominal:      General: Bowel sounds are normal.      Palpations: Abdomen is soft. Tenderness: There is no abdominal tenderness. Musculoskeletal:         General: Tenderness present. Comments: Left hip tender over lateral aspect and ITB. ROM limited due to pain   Lymphadenopathy:      Cervical: No cervical adenopathy. Skin:     General: Skin is warm and dry. Neurological:      Mental Status: She is alert and oriented to person, place, and time. Psychiatric:         Behavior: Behavior normal.           Assessment/Plan:  [place] was seen today for insomnia, leg pain and anxiety. Diagnoses and all orders for this visit:    Left hip pain  -     XR HIP LEFT (2-3 VIEWS); Future  -ITB exercises given    Primary insomnia  -     zolpidem (AMBIEN) 10 MG tablet; Take 1 tablet by mouth nightly as needed for Sleep.  -The current medical regimen is effective;  continue present plan and medications. Anxiety  -     busPIRone (BUSPAR) 7.5 MG tablet; Take 1 tablet by mouth 2 times daily as needed (anxiety)  -Contact office if symptoms do not improve as expected or worsen. Nausea  -     ondansetron (ZOFRAN) 4 MG tablet;  Take 1 tablet by mouth every 8 hours as needed for Nausea or Vomiting  -The current medical regimen is effective;  continue present

## 2020-02-21 ENCOUNTER — HOSPITAL ENCOUNTER (OUTPATIENT)
Dept: CT IMAGING | Age: 40
Discharge: HOME | End: 2020-02-21
Payer: COMMERCIAL

## 2020-02-21 PROCEDURE — 73702 CT LWR EXTREMITY W/O&W/DYE: CPT

## 2020-02-21 PROCEDURE — 6360000004 HC RX CONTRAST MEDICATION: Performed by: RADIOLOGY

## 2020-02-21 RX ADMIN — IOPAMIDOL 80 ML: 755 INJECTION, SOLUTION INTRAVENOUS at 15:10

## 2020-04-17 RX ORDER — ZOLPIDEM TARTRATE 10 MG/1
10 TABLET ORAL NIGHTLY PRN
Qty: 30 TABLET | Refills: 0 | Status: SHIPPED
Start: 2020-04-17 | End: 2020-08-31

## 2020-07-14 ENCOUNTER — OFFICE VISIT (OUTPATIENT)
Dept: FAMILY MEDICINE CLINIC | Age: 40
End: 2020-07-14
Payer: COMMERCIAL

## 2020-07-14 VITALS
DIASTOLIC BLOOD PRESSURE: 80 MMHG | SYSTOLIC BLOOD PRESSURE: 124 MMHG | TEMPERATURE: 97.8 F | BODY MASS INDEX: 35.34 KG/M2 | RESPIRATION RATE: 14 BRPM | HEART RATE: 82 BPM | HEIGHT: 60 IN | OXYGEN SATURATION: 99 % | WEIGHT: 180 LBS

## 2020-07-14 PROCEDURE — G8417 CALC BMI ABV UP PARAM F/U: HCPCS | Performed by: NURSE PRACTITIONER

## 2020-07-14 PROCEDURE — G8427 DOCREV CUR MEDS BY ELIG CLIN: HCPCS | Performed by: NURSE PRACTITIONER

## 2020-07-14 PROCEDURE — 1036F TOBACCO NON-USER: CPT | Performed by: NURSE PRACTITIONER

## 2020-07-14 PROCEDURE — 99213 OFFICE O/P EST LOW 20 MIN: CPT | Performed by: NURSE PRACTITIONER

## 2020-07-14 RX ORDER — ONDANSETRON 4 MG/1
4 TABLET, FILM COATED ORAL EVERY 8 HOURS PRN
Qty: 20 TABLET | Refills: 0 | Status: SHIPPED
Start: 2020-07-14 | End: 2020-08-31

## 2020-07-14 ASSESSMENT — ENCOUNTER SYMPTOMS
WHEEZING: 0
DIARRHEA: 0
NAUSEA: 1
BACK PAIN: 1
VOMITING: 0
COUGH: 0
SHORTNESS OF BREATH: 1
CONSTIPATION: 1

## 2020-07-14 NOTE — PROGRESS NOTES
HPI:  Patient comes intoday for   Chief Complaint   Patient presents with    Hip Pain     ongoing for several months. left hip. lower back    Joint Pain     elbows, shoulders    Fatigue     going on for \"Awhile\" but worsened within past week. .    Complains of pain to elbows and right shoulder for past few weeks. Also has pain to low back, left hip and difficulty lifting left leg. The back and leg pain has been present for several months. Pain is worse in the mornings and then later in the dinner. Pain keeps her awake at night    Also complains of fatigue. States she feels exhausted but cannot fall asleep. Prior to Visit Medications    Medication Sig Taking? Authorizing Provider   ondansetron (ZOFRAN) 4 MG tablet Take 1 tablet by mouth every 8 hours as needed for Nausea or Vomiting Yes DANIELE Luciano CNP   zolpidem (AMBIEN) 10 MG tablet Take 1 tablet by mouth nightly as needed for Sleep. Yes DANIELE Luciano CNP   MELATONIN PO Take by mouth Yes Historical Provider, MD         Allergies   Allergen Reactions    Codeine Rash         Review of Systems  Review of Systems   Constitutional: Positive for fatigue. Negative for activity change, appetite change and unexpected weight change. Respiratory: Positive for shortness of breath (intermittently). Negative for cough and wheezing. Cardiovascular: Positive for palpitations (with anxiety). Negative for chest pain. Gastrointestinal: Positive for constipation and nausea. Negative for diarrhea and vomiting. Musculoskeletal: Positive for arthralgias, back pain and myalgias. Neurological: Positive for weakness and headaches (occasional). Psychiatric/Behavioral: Positive for dysphoric mood (mild) and sleep disturbance. The patient is nervous/anxious.           VS:  /80   Pulse 82   Temp 97.8 °F (36.6 °C) (Temporal)   Resp 14   Ht 5' (1.524 m)   Wt 180 lb (81.6 kg)   LMP  (LMP Unknown)   SpO2 99%   BMI 35.15 kg/m²     Physical Exam  Physical Exam  Constitutional:       General: She is not in acute distress. Appearance: Normal appearance. She is well-developed. HENT:      Head: Normocephalic and atraumatic. Neck:      Thyroid: No thyromegaly. Trachea: No tracheal deviation. Cardiovascular:      Rate and Rhythm: Normal rate and regular rhythm. Heart sounds: No murmur. Pulmonary:      Effort: Pulmonary effort is normal.      Breath sounds: Normal breath sounds. No wheezing or rales. Chest:      Chest wall: No tenderness. Abdominal:      General: Bowel sounds are normal.      Palpations: Abdomen is soft. Tenderness: There is no abdominal tenderness. Musculoskeletal:         General: Tenderness present. Comments: Tenderness over right AC and anterior shoulder. Bilateral lateral epicondyles are TTP. Bilateral ITB tenderness, right > left   Lymphadenopathy:      Cervical: No cervical adenopathy. Skin:     General: Skin is warm and dry. Neurological:      Mental Status: She is alert and oriented to person, place, and time. Psychiatric:         Mood and Affect: Mood normal.         Behavior: Behavior normal.           Assessment/Plan:  Lynn Grijalva was seen today for hip pain, joint pain and fatigue. Diagnoses and all orders for this visit:    Arthralgia, unspecified joint  -     JOSSE; Future  -     Sedimentation Rate; Future  -     Rheumatoid Factor; Future    Nausea  -     ondansetron (ZOFRAN) 4 MG tablet; Take 1 tablet by mouth every 8 hours as needed for Nausea or Vomiting    Vitamin D deficiency  -     Vitamin D 25 Hydroxy; Future    Muscle weakness  -     TSH without Reflex; Future  -     T4, Free; Future  -     Vitamin B12 & Folate; Future    Fatigue, unspecified type  -     TSH without Reflex; Future  -     T4, Free; Future  -     CBC Auto Differential; Future  -     Comprehensive Metabolic Panel; Future  -     Vitamin B12 & Folate;  Future          Greater than 15  Minutes was spent with patient and more than 50% of the time was spent face to facecounseling and educating regarding diagnoses

## 2020-07-15 ENCOUNTER — HOSPITAL ENCOUNTER (OUTPATIENT)
Age: 40
Discharge: HOME | End: 2020-07-17
Payer: COMMERCIAL

## 2020-07-15 LAB
ALBUMIN SERPL-MCNC: 4.2 G/DL (ref 3.5–5.2)
ALP BLD-CCNC: 81 U/L (ref 35–104)
ALT SERPL-CCNC: 22 U/L (ref 0–32)
ANION GAP SERPL CALCULATED.3IONS-SCNC: 20 MMOL/L (ref 7–16)
AST SERPL-CCNC: 22 U/L (ref 0–31)
BASOPHILS ABSOLUTE: 0.05 E9/L (ref 0–0.2)
BASOPHILS RELATIVE PERCENT: 0.6 % (ref 0–2)
BILIRUB SERPL-MCNC: 0.4 MG/DL (ref 0–1.2)
BUN BLDV-MCNC: 13 MG/DL (ref 6–20)
CALCIUM SERPL-MCNC: 9.7 MG/DL (ref 8.6–10.2)
CHLORIDE BLD-SCNC: 106 MMOL/L (ref 98–107)
CO2: 19 MMOL/L (ref 22–29)
CREAT SERPL-MCNC: 0.8 MG/DL (ref 0.5–1)
EOSINOPHILS ABSOLUTE: 0.18 E9/L (ref 0.05–0.5)
EOSINOPHILS RELATIVE PERCENT: 2.2 % (ref 0–6)
FOLATE: 17.6 NG/ML (ref 4.8–24.2)
GFR AFRICAN AMERICAN: >60
GFR NON-AFRICAN AMERICAN: >60 ML/MIN/1.73
GLUCOSE BLD-MCNC: 93 MG/DL (ref 74–99)
HCT VFR BLD CALC: 44.2 % (ref 34–48)
HEMOGLOBIN: 14 G/DL (ref 11.5–15.5)
IMMATURE GRANULOCYTES #: 0.02 E9/L
IMMATURE GRANULOCYTES %: 0.2 % (ref 0–5)
LYMPHOCYTES ABSOLUTE: 1.98 E9/L (ref 1.5–4)
LYMPHOCYTES RELATIVE PERCENT: 24.2 % (ref 20–42)
MCH RBC QN AUTO: 31 PG (ref 26–35)
MCHC RBC AUTO-ENTMCNC: 31.7 % (ref 32–34.5)
MCV RBC AUTO: 97.8 FL (ref 80–99.9)
MONOCYTES ABSOLUTE: 0.49 E9/L (ref 0.1–0.95)
MONOCYTES RELATIVE PERCENT: 6 % (ref 2–12)
NEUTROPHILS ABSOLUTE: 5.45 E9/L (ref 1.8–7.3)
NEUTROPHILS RELATIVE PERCENT: 66.8 % (ref 43–80)
PDW BLD-RTO: 13.1 FL (ref 11.5–15)
PLATELET # BLD: 294 E9/L (ref 130–450)
PMV BLD AUTO: 11.3 FL (ref 7–12)
POTASSIUM SERPL-SCNC: 4.8 MMOL/L (ref 3.5–5)
RBC # BLD: 4.52 E12/L (ref 3.5–5.5)
RHEUMATOID FACTOR: <10 IU/ML (ref 0–13)
SEDIMENTATION RATE, ERYTHROCYTE: 14 MM/HR (ref 0–20)
SODIUM BLD-SCNC: 145 MMOL/L (ref 132–146)
T4 FREE: 1.08 NG/DL (ref 0.93–1.7)
TOTAL PROTEIN: 7 G/DL (ref 6.4–8.3)
TSH SERPL DL<=0.05 MIU/L-ACNC: 2.25 UIU/ML (ref 0.27–4.2)
VITAMIN B-12: 259 PG/ML (ref 211–946)
VITAMIN D 25-HYDROXY: 28 NG/ML (ref 30–100)
WBC # BLD: 8.2 E9/L (ref 4.5–11.5)

## 2020-07-15 PROCEDURE — 36415 COLL VENOUS BLD VENIPUNCTURE: CPT

## 2020-07-15 PROCEDURE — 82306 VITAMIN D 25 HYDROXY: CPT

## 2020-07-15 PROCEDURE — 82607 VITAMIN B-12: CPT

## 2020-07-15 PROCEDURE — 84443 ASSAY THYROID STIM HORMONE: CPT

## 2020-07-15 PROCEDURE — 86431 RHEUMATOID FACTOR QUANT: CPT

## 2020-07-15 PROCEDURE — 85651 RBC SED RATE NONAUTOMATED: CPT

## 2020-07-15 PROCEDURE — 84439 ASSAY OF FREE THYROXINE: CPT

## 2020-07-15 PROCEDURE — 80053 COMPREHEN METABOLIC PANEL: CPT

## 2020-07-15 PROCEDURE — 86038 ANTINUCLEAR ANTIBODIES: CPT

## 2020-07-15 PROCEDURE — 85025 COMPLETE CBC W/AUTO DIFF WBC: CPT

## 2020-07-15 PROCEDURE — 86039 ANTINUCLEAR ANTIBODIES (ANA): CPT

## 2020-07-15 PROCEDURE — 82746 ASSAY OF FOLIC ACID SERUM: CPT

## 2020-07-17 LAB
ANA PATTERN: ABNORMAL
ANA TITER: ABNORMAL
ANTI-NUCLEAR ANTIBODY (ANA): POSITIVE

## 2020-08-31 RX ORDER — ZOLPIDEM TARTRATE 10 MG/1
10 TABLET ORAL NIGHTLY PRN
Qty: 30 TABLET | Refills: 0 | Status: SHIPPED
Start: 2020-08-31 | End: 2020-11-18 | Stop reason: SDUPTHER

## 2020-08-31 RX ORDER — ONDANSETRON 4 MG/1
4 TABLET, FILM COATED ORAL EVERY 8 HOURS PRN
Qty: 20 TABLET | Refills: 0 | Status: SHIPPED
Start: 2020-08-31 | End: 2020-11-18 | Stop reason: SDUPTHER

## 2020-11-18 ENCOUNTER — OFFICE VISIT (OUTPATIENT)
Dept: FAMILY MEDICINE CLINIC | Age: 40
End: 2020-11-18
Payer: COMMERCIAL

## 2020-11-18 VITALS
OXYGEN SATURATION: 99 % | HEIGHT: 61 IN | HEART RATE: 92 BPM | WEIGHT: 183 LBS | DIASTOLIC BLOOD PRESSURE: 78 MMHG | RESPIRATION RATE: 14 BRPM | TEMPERATURE: 96 F | BODY MASS INDEX: 34.55 KG/M2 | SYSTOLIC BLOOD PRESSURE: 122 MMHG

## 2020-11-18 PROCEDURE — 90471 IMMUNIZATION ADMIN: CPT | Performed by: NURSE PRACTITIONER

## 2020-11-18 PROCEDURE — 90688 IIV4 VACCINE SPLT 0.5 ML IM: CPT | Performed by: NURSE PRACTITIONER

## 2020-11-18 PROCEDURE — 1036F TOBACCO NON-USER: CPT | Performed by: NURSE PRACTITIONER

## 2020-11-18 PROCEDURE — G8482 FLU IMMUNIZE ORDER/ADMIN: HCPCS | Performed by: NURSE PRACTITIONER

## 2020-11-18 PROCEDURE — G8427 DOCREV CUR MEDS BY ELIG CLIN: HCPCS | Performed by: NURSE PRACTITIONER

## 2020-11-18 PROCEDURE — 99213 OFFICE O/P EST LOW 20 MIN: CPT | Performed by: NURSE PRACTITIONER

## 2020-11-18 PROCEDURE — G8417 CALC BMI ABV UP PARAM F/U: HCPCS | Performed by: NURSE PRACTITIONER

## 2020-11-18 RX ORDER — ZOLPIDEM TARTRATE 10 MG/1
10 TABLET ORAL NIGHTLY PRN
Qty: 30 TABLET | Refills: 0 | Status: SHIPPED
Start: 2020-11-18 | End: 2021-01-20 | Stop reason: SDUPTHER

## 2020-11-18 RX ORDER — ONDANSETRON 4 MG/1
4 TABLET, FILM COATED ORAL EVERY 8 HOURS PRN
Qty: 20 TABLET | Refills: 0 | Status: SHIPPED
Start: 2020-11-18 | End: 2021-12-13

## 2020-11-18 ASSESSMENT — ENCOUNTER SYMPTOMS
VOMITING: 0
NAUSEA: 1
COUGH: 0
WHEEZING: 0
DIARRHEA: 0
CONSTIPATION: 1
SHORTNESS OF BREATH: 0
BACK PAIN: 1

## 2020-11-18 NOTE — PROGRESS NOTES
HPI:  Patient comes intoday for   Chief Complaint   Patient presents with    Hip Pain     left hip. starts in back and goes around. was taking gabapentin from [address] [place] [address].  Health Maintenance     flu vaccine offered, pt undecided.  Insomnia     requesting refill of ambien. .    Complains of insomnia-difficulty staying asleep. States some improvement with melatonin and ambien. No side effects noted. Complains of pain to left hip/thigh. Pain starts in low back. Pain present for approximately one year. Pain aggravated by standing more than 10 minutes. [facility] diagnosed her with fibromyalgia. She took gabapentin and effexor with no improvement. Complains of anxiety. She is under a lot of stress, father is very ill and living with her. Prior to Visit Medications    Medication Sig Taking? Authorizing Provider   zolpidem (AMBIEN) 10 MG tablet Take 1 tablet by mouth nightly as needed for Sleep. Yes DANIELE Mckeon CNP   ondansetron (ZOFRAN) 4 MG tablet Take 1 tablet by mouth every 8 hours as needed for Nausea or Vomiting Yes DANIELE Mckeon CNP   MELATONIN PO Take by mouth Yes Historical Provider, MD         Allergies   Allergen Reactions    Codeine Rash         Review of Systems  Review of Systems   Constitutional: Negative for activity change, appetite change and unexpected weight change. Respiratory: Negative for cough, shortness of breath and wheezing. Cardiovascular: Positive for chest pain and palpitations. Occasional   Gastrointestinal: Positive for constipation and nausea. Negative for diarrhea and vomiting. Musculoskeletal: Positive for arthralgias, back pain and myalgias. Neurological: Positive for light-headedness and headaches. Negative for weakness and numbness. Psychiatric/Behavioral: Positive for dysphoric mood and sleep disturbance. Negative for suicidal ideas. The patient is nervous/anxious.           VS:  /78   Pulse 92 Temp 96 °F (35.6 °C) (Temporal)   Resp 14   Ht 5' 1\" (1.549 m)   Wt 183 lb (83 kg)   LMP 11/04/2020   SpO2 99%   BMI 34.58 kg/m²     Physical Exam  Physical Exam  Constitutional:       General: She is not in acute distress. Appearance: She is well-developed. HENT:      Head: Normocephalic and atraumatic. Neck:      Thyroid: No thyromegaly. Trachea: No tracheal deviation. Cardiovascular:      Rate and Rhythm: Normal rate and regular rhythm. Heart sounds: No murmur. Pulmonary:      Effort: Pulmonary effort is normal.      Breath sounds: Normal breath sounds. No wheezing or rales. Chest:      Chest wall: No tenderness. Abdominal:      General: Bowel sounds are normal.      Palpations: Abdomen is soft. Tenderness: There is no abdominal tenderness. Musculoskeletal:         General: Tenderness present. Comments: Pain to lumbar spine and bilateral SI, left worse than right. Pain along left ITB  ROM limited due to pain. Negative SLR bilateral. Upper and lower extremities equal and strong. Lymphadenopathy:      Cervical: No cervical adenopathy. Skin:     General: Skin is warm and dry. Neurological:      Mental Status: She is alert and oriented to person, place, and time. Psychiatric:         Mood and Affect: Mood normal.         Behavior: Behavior normal.           Assessment/Plan:  Al Vásquez was seen today for hip pain, health maintenance and insomnia. Diagnoses and all orders for this visit:    Primary insomnia  -     zolpidem (AMBIEN) 10 MG tablet; Take 1 tablet by mouth nightly as needed for Sleep.  -The current medical regimen is effective;  continue present plan and medications. Nausea  -     ondansetron (ZOFRAN) 4 MG tablet; Take 1 tablet by mouth every 8 hours as needed for Nausea or Vomiting  -The current medical regimen is effective;  continue present plan and medications.     Chronic bilateral low back pain with left-sided sciatica  -low back exercises given  -will consider chiropractic treatment (has previously seen)    Flu vaccine need  -     INFLUENZA, QUADV, 6-35 MO, IM, MDV, 0.25ML (AFLURIA QUADV)    Controlled Substance Monitoring:    Acute and Chronic Pain Monitoring:   RX Monitoring 11/18/2020   Attestation -   Periodic Controlled Substance Monitoring No signs of potential drug abuse or diversion identified.            Greater than 15  Minutes was spent with patient and more than 50% of the time was spent face to facecounseling and educating regarding diagnoses

## 2020-12-11 ENCOUNTER — HOSPITAL ENCOUNTER (EMERGENCY)
Age: 40
Discharge: HOME | End: 2020-12-11
Payer: COMMERCIAL

## 2020-12-11 VITALS
OXYGEN SATURATION: 100 % | SYSTOLIC BLOOD PRESSURE: 111 MMHG | RESPIRATION RATE: 20 BRPM | TEMPERATURE: 98.1 F | HEIGHT: 61 IN | HEART RATE: 90 BPM | BODY MASS INDEX: 33.04 KG/M2 | DIASTOLIC BLOOD PRESSURE: 82 MMHG | WEIGHT: 175 LBS

## 2020-12-11 LAB
BILIRUBIN URINE: NEGATIVE
BLOOD, URINE: NEGATIVE
CLARITY: CLEAR
COLOR: YELLOW
GLUCOSE URINE: NEGATIVE MG/DL
HCG(URINE) PREGNANCY TEST: NEGATIVE
KETONES, URINE: NEGATIVE MG/DL
LEUKOCYTE ESTERASE, URINE: NEGATIVE
NITRITE, URINE: NEGATIVE
PH UA: 7 (ref 5–9)
PROTEIN UA: NEGATIVE MG/DL
SPECIFIC GRAVITY UA: 1.02 (ref 1–1.03)
UROBILINOGEN, URINE: 0.2 E.U./DL

## 2020-12-11 PROCEDURE — 99212 OFFICE O/P EST SF 10 MIN: CPT

## 2020-12-11 PROCEDURE — 81003 URINALYSIS AUTO W/O SCOPE: CPT

## 2020-12-11 PROCEDURE — 81025 URINE PREGNANCY TEST: CPT

## 2020-12-11 PROCEDURE — 6360000002 HC RX W HCPCS: Performed by: PHYSICIAN ASSISTANT

## 2020-12-11 PROCEDURE — 96372 THER/PROPH/DIAG INJ SC/IM: CPT

## 2020-12-11 RX ORDER — METHOCARBAMOL 500 MG/1
500 TABLET, FILM COATED ORAL 3 TIMES DAILY
Qty: 9 TABLET | Refills: 0 | Status: SHIPPED | OUTPATIENT
Start: 2020-12-11 | End: 2020-12-14

## 2020-12-11 RX ORDER — KETOROLAC TROMETHAMINE 30 MG/ML
30 INJECTION, SOLUTION INTRAMUSCULAR; INTRAVENOUS ONCE
Status: COMPLETED | OUTPATIENT
Start: 2020-12-11 | End: 2020-12-11

## 2020-12-11 RX ORDER — ORPHENADRINE CITRATE 30 MG/ML
60 INJECTION INTRAMUSCULAR; INTRAVENOUS ONCE
Status: COMPLETED | OUTPATIENT
Start: 2020-12-11 | End: 2020-12-11

## 2020-12-11 RX ORDER — IBUPROFEN 200 MG
400 TABLET ORAL EVERY 6 HOURS PRN
COMMUNITY

## 2020-12-11 RX ADMIN — KETOROLAC TROMETHAMINE 30 MG: 30 INJECTION, SOLUTION INTRAMUSCULAR; INTRAVENOUS at 19:45

## 2020-12-11 RX ADMIN — ORPHENADRINE CITRATE 60 MG: 30 INJECTION INTRAMUSCULAR; INTRAVENOUS at 19:45

## 2020-12-11 ASSESSMENT — PAIN DESCRIPTION - PAIN TYPE
TYPE: ACUTE PAIN
TYPE: ACUTE PAIN

## 2020-12-11 ASSESSMENT — PAIN DESCRIPTION - PROGRESSION
CLINICAL_PROGRESSION: GRADUALLY IMPROVING
CLINICAL_PROGRESSION: GRADUALLY WORSENING

## 2020-12-11 ASSESSMENT — PAIN DESCRIPTION - DESCRIPTORS
DESCRIPTORS: ACHING
DESCRIPTORS: ACHING

## 2020-12-11 ASSESSMENT — PAIN DESCRIPTION - FREQUENCY
FREQUENCY: CONTINUOUS
FREQUENCY: CONTINUOUS

## 2020-12-11 ASSESSMENT — PAIN SCALES - GENERAL
PAINLEVEL_OUTOF10: 7
PAINLEVEL_OUTOF10: 7
PAINLEVEL_OUTOF10: 5

## 2020-12-11 ASSESSMENT — PAIN DESCRIPTION - LOCATION
LOCATION: BACK
LOCATION: BACK

## 2020-12-11 ASSESSMENT — PAIN DESCRIPTION - ORIENTATION
ORIENTATION: RIGHT;UPPER
ORIENTATION: RIGHT;UPPER

## 2020-12-11 ASSESSMENT — PAIN DESCRIPTION - ONSET
ONSET: GRADUAL
ONSET: GRADUAL

## 2020-12-12 NOTE — ED PROVIDER NOTES
[id number] [facility]  Department of Emergency Medicine   ED  Encounter Note  Admit Date/RoomTime: 2020  7:24 PM  ED Room:     NAME: Alin Villanueva  : 1980  MRN: [id number]     Chief Complaint:  Back Pain (Having pain in right upper back and right shoulder blade area. Denies specific injury.)    History of Present Illness       Alin Villanueva is a 47 y.o. old female presents to the ED with a sudden onset of sharp stabbing upper back pain. Patient states she has a sharp stabbing, burning pain just below her right shoulder blade. Sudden onset about 2 hours ago. She states she did go grocery shopping today and had some heavy lifting. At the time it hit she was just making dinner. She states she is taken Advil at home about an hour and a half ago. In addition she has applied heat and ice. All without relief. Pain does not radiate into her chest or abdomen. She denies any recent upper respiratory infection denies cough or chest congestion. She states the pain is making her a little nauseated. She denies any vomiting. Patient does admit to a history of kidney stone, but feels this pain is higher than previous kidney stones. In addition she denies any urinary symptoms of burning or seeing blood. ROS   Pertinent positives and negatives are stated within HPI, all other systems reviewed and are negative. Past Medical History:  has a past medical history of Anxiety, Kidney stone, and New onset headache. Surgical History:  has a past surgical history that includes Tonsillectomy; Cystoscopy (2016); and Lithotripsy. Social History:  reports that she has never smoked. She has never used smokeless tobacco. She reports that she does not drink alcohol or use drugs. Family History: family history is not on file.      Allergies: Codeine    Physical Exam   Oxygen Saturation Interpretation: Normal.        ED Triage Vitals [20 1926]   BP Temp Temp Source Pulse Resp SpO2 Height Weight   111/82 98.1 °F (36.7 °C) Infrared 90 20 100 % 5' 1\" (1.549 m) 175 lb (79.4 kg)         General:  NAD. Alert and Oriented. Well-appearing. Skin:  Warm, dry. No rashes. No shingles to the back. Head:  Normocephalic. Atraumatic. Eyes:  EOMI. Conjunctiva normal.  ENT:  Oral mucosa moist.  Airway patent. Neck:  Supple. Normal ROM. Respiratory:  No respiratory distress. No labored breathing. Lungs clear without rales, rhonchi or wheezing. Cardiovascular:  Regular rate. No Murmur. No peripheral edema. Extremities warm and good color. Chest:  Abdomen:  Soft, nondistended. Normal bowel sounds. Nontender to palpation all 4 quadrants. Negative rebound, negative guarding. Rectal:  Gu: Bladder nontender and non distended. No CVA tenderness. Pelvic:  Extremities:  Normal ROM. Nontender to palpation. Atraumatic. Back:  Normal ROM. Pinpoint localized tenderness just inferior to the tip of the scapula. I can literally put 2 fingers on her spot of pain. In addition raising her right arm above 90 degrees does exacerbate the pain. Neuro:  Alert and Oriented to person, place, time and situation. Normal LOC. Moves all extremities. Speech fluent. Psych:  Calm and Cooperative. Normal thought process. Normal judgement.         Lab / Imaging Results   (All laboratory and radiology results have been personally reviewed by myself)  Labs:  Results for orders placed or performed during the hospital encounter of 12/11/20   Urinalysis, reflex to microscopic   Result Value Ref Range    Color, UA Yellow Straw/Yellow    Clarity, UA Clear Clear    Glucose, Ur Negative Negative mg/dL    Bilirubin Urine Negative Negative    Ketones, Urine Negative Negative mg/dL    Specific Gravity, UA 1.020 1.005 - 1.030    Blood, Urine Negative Negative    pH, UA 7.0 5.0 - 9.0    Protein, UA Negative Negative mg/dL    Urobilinogen, Urine 0.2 <2.0 E.U./dL    Nitrite, Urine Negative Negative    Leukocyte Esterase, Urine Negative Negative   Pregnancy, Urine   Result Value Ref Range    HCG(Urine) Pregnancy Test NEGATIVE NEGATIVE       Imaging: All Radiology results interpreted by Radiologist unless otherwise noted. No orders to display       ED Course / Medical Decision Making     Medications   ketorolac (TORADOL) injection 30 mg (30 mg Intramuscular Given 12/11/20 1945)   orphenadrine (NORFLEX) injection 60 mg (60 mg Intramuscular Given 12/11/20 1945)        Re-examination:  12/11/20       Time:    Patients condition is improving after treatment. Consult(s):   None    Procedure(s):   none    Medical Decision Making:    Films were not obtained based on negative suspicion for bony injury as per history/physical findings . [place] At this time the patient is without objective evidence of an acute process requiring inpatient management. Plan of Care/Counseling:  I reviewed today's visit with the patient in addition to providing specific details for the plan of care and counseling regarding the diagnosis and prognosis. Questions are answered at this time and are agreeable with the plan. Urine is negative for blood and infection. In addition the pain is higher than she is used to with kidney stone. She has had no upper respiratory infection for a pneumonia or lung pain. Patient has been advised to continue anti-inflammatories and I will add on a muscle relaxer for the next couple days. Assessment      1. Spasm of muscle New Problem     Plan   Discharge to home. Patient condition is good    New Medications     New Prescriptions    METHOCARBAMOL (ROBAXIN) 500 MG TABLET    Take 1 tablet by mouth 3 times daily for 3 days     Electronically signed by CHAYO Nicole   DD: 12/11/20  **This report was transcribed using voice recognition software. Every effort was made to ensure accuracy; however, inadvertent computerized transcription errors may be present.   END OF ED PROVIDER NOTE       Shira Nicole  12/11/20

## 2021-01-20 DIAGNOSIS — F51.01 PRIMARY INSOMNIA: ICD-10-CM

## 2021-01-20 RX ORDER — ZOLPIDEM TARTRATE 10 MG/1
10 TABLET ORAL NIGHTLY PRN
Qty: 30 TABLET | Refills: 0 | Status: SHIPPED
Start: 2021-01-20 | End: 2021-03-24

## 2021-01-20 RX ORDER — ZOLPIDEM TARTRATE 10 MG/1
10 TABLET ORAL NIGHTLY PRN
Qty: 30 TABLET | Refills: 0 | OUTPATIENT
Start: 2021-01-20 | End: 2022-01-26

## 2021-02-26 ENCOUNTER — NURSE TRIAGE (OUTPATIENT)
Dept: OTHER | Facility: CLINIC | Age: 41
End: 2021-02-26

## 2021-02-26 ENCOUNTER — OFFICE VISIT (OUTPATIENT)
Dept: FAMILY MEDICINE CLINIC | Age: 41
End: 2021-02-26
Payer: COMMERCIAL

## 2021-02-26 ENCOUNTER — TELEPHONE (OUTPATIENT)
Dept: ADMINISTRATIVE | Age: 41
End: 2021-02-26

## 2021-02-26 VITALS
HEART RATE: 84 BPM | DIASTOLIC BLOOD PRESSURE: 68 MMHG | HEIGHT: 61 IN | OXYGEN SATURATION: 99 % | RESPIRATION RATE: 16 BRPM | BODY MASS INDEX: 34.55 KG/M2 | SYSTOLIC BLOOD PRESSURE: 118 MMHG | WEIGHT: 183 LBS | TEMPERATURE: 97.1 F

## 2021-02-26 DIAGNOSIS — R10.11 RUQ PAIN: Primary | ICD-10-CM

## 2021-02-26 DIAGNOSIS — R11.0 NAUSEA: ICD-10-CM

## 2021-02-26 PROCEDURE — 99213 OFFICE O/P EST LOW 20 MIN: CPT | Performed by: NURSE PRACTITIONER

## 2021-02-26 SDOH — ECONOMIC STABILITY: FOOD INSECURITY: WITHIN THE PAST 12 MONTHS, YOU WORRIED THAT YOUR FOOD WOULD RUN OUT BEFORE YOU GOT MONEY TO BUY MORE.: NEVER TRUE

## 2021-02-26 SDOH — ECONOMIC STABILITY: TRANSPORTATION INSECURITY
IN THE PAST 12 MONTHS, HAS THE LACK OF TRANSPORTATION KEPT YOU FROM MEDICAL APPOINTMENTS OR FROM GETTING MEDICATIONS?: NO

## 2021-02-26 ASSESSMENT — ENCOUNTER SYMPTOMS
VOMITING: 0
NAUSEA: 1
CONSTIPATION: 0
COUGH: 0
ABDOMINAL PAIN: 1
DIARRHEA: 0
WHEEZING: 0
SHORTNESS OF BREATH: 0

## 2021-02-26 ASSESSMENT — PATIENT HEALTH QUESTIONNAIRE - PHQ9
SUM OF ALL RESPONSES TO PHQ QUESTIONS 1-9: 2

## 2021-02-26 NOTE — TELEPHONE ENCOUNTER
Patient called Jeanine Mckeon at [facility] pre-service center [facility])  with red flag complaint. Brief description of triage: Patient calling for concerns for right upper abdominal pain over the past x 2 weeks. Triage indicates for patient to see today in office. Care advice provided, patient verbalizes understanding; denies any other questions or concerns; instructed to call back for any new or worsening symptoms. Writer provided warm transfer to [facility] for appointment scheduling. Attention Provider: Thank you for allowing me to participate in the care of your patient. The patient was connected to triage in response to information provided to the [facility]. Please do not respond through this encounter as the response is not directed to a shared pool. Reason for Disposition   MODERATE OR MILD pain that comes and goes (cramps) lasts > 24 hours    Answer Assessment - Initial Assessment Questions  1. LOCATION: \"Where does it hurt? \"       Right upper abdomen under her ribs    2. RADIATION: \"Does the pain shoot anywhere else? \" (e.g., chest, back)      Sometimes into her back    3. ONSET: \"When did the pain begin? \" (e.g., minutes, hours or days ago)       X 2 weeks    4. SUDDEN: \"Gradual or sudden onset? \"      gradula    5. PATTERN \"Does the pain come and go, or is it constant? \"     - If constant: \"Is it getting better, staying the same, or worsening? \"       (Note: Constant means the pain never goes away completely; most serious pain is constant and it progresses)      - If intermittent: \"How long does it last?\" \"Do you have pain now? \"      (Note: Intermittent means the pain goes away completely between bouts)      Comes and goes, pressure feeling, dull aching    6. SEVERITY: \"How bad is the pain? \"  (e.g., Scale 1-10; mild, moderate, or severe)    - MILD (1-3): doesn't interfere with normal activities, abdomen soft and not tender to touch     - MODERATE (4-7): interferes with normal activities or awakens from sleep, tender to touch     - SEVERE (8-10): excruciating pain, doubled over, unable to do any normal activities       Yes, 3/10    7. RECURRENT SYMPTOM: \"Have you ever had this type of abdominal pain before? \" If so, ask: \"When was the last time? \" and \"What happened that time? \"       Yes, kidney stone last year but pain was in a different area    8. CAUSE: \"What do you think is causing the abdominal pain? \"      unusre    9. RELIEVING/AGGRAVATING FACTORS: \"What makes it better or worse? \" (e.g., movement, antacids, bowel movement)      Eating:  \"feels full\"    10. OTHER SYMPTOMS: \"Has there been any vomiting, diarrhea, constipation, or urine problems? \"        Nausea, chronic constipation    11. PREGNANCY: \"Is there any chance you are pregnant? \" \"When was your last menstrual period? \"        no    Protocols used: ABDOMINAL PAIN - FEMALE-ADULT-[place]

## 2021-02-26 NOTE — TELEPHONE ENCOUNTER
Received call from [facility] - disposition to be seen today or go to Urgent Care for upper rt abdominal pain above ribcage w/nausea; appt scheduled w/Peggy Estevez

## 2021-02-26 NOTE — PROGRESS NOTES
Severino Nieto (:  1980) is a 47 y.o. female,Established patient, here for evaluation of the following chief complaint(s):  Abdominal Pain (x 2 years upper right - under ribs (on and off)), Nausea (x 2 weeks), and Constipation (small BM this am - pt sates she never feels like she has full bm or empties bladder)      ASSESSMENT/PLAN:  1. RUQ pain  -     US GALLBLADDER RUQ; Future  2. Nausea  -     US GALLBLADDER RUQ; Future      Return if symptoms worsen or fail to improve. If GB sono good will refer to GI    SUBJECTIVE/OBJECTIVE:  Complains of right upper abdominal pain for past 2 years. States worse for pas 2 weeks. States feels like pressure under right. Feels bloated and having constipation. Pain worse after eating. Has GB. Testing 2 years ago was normal.  Pain does radiate to back at times. Feels different than kidney stone pain. Also has had nausea for past 2 weeks. Has not taken any medications for it. Patient went to Urgent Care 2 months ago when pain to right side of upper/mid back was severe. Pain was worse with moving arm. Has not seen GI      Review of Systems   Constitutional: Positive for appetite change (down). Negative for activity change, chills, diaphoresis, fever and unexpected weight change. Respiratory: Negative for cough, shortness of breath and wheezing. Cardiovascular: Positive for chest pain and palpitations. Occasional   Gastrointestinal: Positive for abdominal pain and nausea. Negative for constipation, diarrhea and vomiting. Genitourinary: Positive for frequency. Negative for difficulty urinating and hematuria. Neurological: Negative for weakness, light-headedness and headaches. Physical Exam  Constitutional:       General: She is not in acute distress. Appearance: She is well-developed. HENT:      Head: Normocephalic and atraumatic. Neck:      Thyroid: No thyromegaly. Trachea: No tracheal deviation.    Cardiovascular:

## 2021-02-27 ENCOUNTER — HOSPITAL ENCOUNTER (OUTPATIENT)
Dept: ULTRASOUND IMAGING | Age: 41
Discharge: HOME | End: 2021-02-27
Payer: COMMERCIAL

## 2021-02-27 DIAGNOSIS — R10.11 RUQ PAIN: ICD-10-CM

## 2021-02-27 DIAGNOSIS — R11.0 NAUSEA: ICD-10-CM

## 2021-02-27 PROCEDURE — 76705 ECHO EXAM OF ABDOMEN: CPT

## 2021-03-01 DIAGNOSIS — R10.11 RUQ PAIN: Primary | ICD-10-CM

## 2021-03-01 DIAGNOSIS — R11.0 NAUSEA: ICD-10-CM

## 2021-03-23 DIAGNOSIS — F51.01 PRIMARY INSOMNIA: ICD-10-CM

## 2021-03-24 RX ORDER — ZOLPIDEM TARTRATE 10 MG/1
TABLET ORAL
Qty: 30 TABLET | Refills: 0 | Status: SHIPPED | OUTPATIENT
Start: 2021-03-24 | End: 2021-04-23

## 2021-04-26 ENCOUNTER — HOSPITAL ENCOUNTER (OUTPATIENT)
Dept: NUCLEAR MEDICINE | Age: 41
Discharge: HOME | End: 2021-04-26
Payer: COMMERCIAL

## 2021-04-26 VITALS — WEIGHT: 184 LBS | BODY MASS INDEX: 34.77 KG/M2

## 2021-04-26 DIAGNOSIS — R10.11 ABDOMINAL PAIN, RIGHT UPPER QUADRANT: ICD-10-CM

## 2021-04-26 PROCEDURE — 3430000000 HC RX DIAGNOSTIC RADIOPHARMACEUTICAL: Performed by: RADIOLOGY

## 2021-04-26 PROCEDURE — 2580000003 HC RX 258: Performed by: RADIOLOGY

## 2021-04-26 PROCEDURE — A9537 TC99M MEBROFENIN: HCPCS | Performed by: RADIOLOGY

## 2021-04-26 PROCEDURE — 78227 HEPATOBIL SYST IMAGE W/DRUG: CPT

## 2021-04-26 PROCEDURE — 6360000002 HC RX W HCPCS: Performed by: RADIOLOGY

## 2021-04-26 RX ADMIN — Medication 6 MILLICURIE: at 10:07

## 2021-04-26 RX ADMIN — SODIUM CHLORIDE 1.67 MCG: 9 INJECTION, SOLUTION INTRAVENOUS at 11:02

## 2021-08-17 ENCOUNTER — OFFICE VISIT (OUTPATIENT)
Dept: FAMILY MEDICINE CLINIC | Age: 41
End: 2021-08-17
Payer: COMMERCIAL

## 2021-08-17 VITALS
RESPIRATION RATE: 20 BRPM | OXYGEN SATURATION: 98 % | DIASTOLIC BLOOD PRESSURE: 70 MMHG | TEMPERATURE: 97.8 F | SYSTOLIC BLOOD PRESSURE: 120 MMHG | HEART RATE: 96 BPM

## 2021-08-17 DIAGNOSIS — U07.1 COVID-19: Primary | ICD-10-CM

## 2021-08-17 DIAGNOSIS — F51.01 PRIMARY INSOMNIA: ICD-10-CM

## 2021-08-17 PROCEDURE — 99213 OFFICE O/P EST LOW 20 MIN: CPT | Performed by: NURSE PRACTITIONER

## 2021-08-17 RX ORDER — BENZONATATE 100 MG/1
100 CAPSULE ORAL 3 TIMES DAILY PRN
Qty: 30 CAPSULE | Refills: 0 | Status: SHIPPED | OUTPATIENT
Start: 2021-08-17 | End: 2021-08-24

## 2021-08-17 RX ORDER — ZOLPIDEM TARTRATE 10 MG/1
10 TABLET ORAL NIGHTLY PRN
Qty: 30 TABLET | Refills: 0 | Status: SHIPPED
Start: 2021-08-17 | End: 2022-01-28 | Stop reason: SDUPTHER

## 2021-08-17 RX ORDER — METHYLPREDNISOLONE 4 MG/1
TABLET ORAL
Qty: 1 KIT | Refills: 0 | Status: SHIPPED
Start: 2021-08-17 | End: 2022-01-28

## 2021-08-17 RX ORDER — DOXYCYCLINE HYCLATE 100 MG
100 TABLET ORAL 2 TIMES DAILY
Qty: 14 TABLET | Refills: 0 | Status: SHIPPED | OUTPATIENT
Start: 2021-08-17 | End: 2021-08-24

## 2021-08-17 RX ORDER — PANTOPRAZOLE SODIUM 40 MG/1
TABLET, DELAYED RELEASE ORAL
COMMUNITY
Start: 2021-06-27 | End: 2022-01-28 | Stop reason: SDUPTHER

## 2021-12-13 DIAGNOSIS — R11.0 NAUSEA: ICD-10-CM

## 2021-12-13 RX ORDER — ONDANSETRON 4 MG/1
TABLET, FILM COATED ORAL
Qty: 20 TABLET | Refills: 0 | Status: SHIPPED
Start: 2021-12-13 | End: 2022-01-28 | Stop reason: SDUPTHER

## 2022-01-28 ENCOUNTER — OFFICE VISIT (OUTPATIENT)
Dept: FAMILY MEDICINE CLINIC | Age: 42
End: 2022-01-28
Payer: COMMERCIAL

## 2022-01-28 VITALS
SYSTOLIC BLOOD PRESSURE: 122 MMHG | HEART RATE: 85 BPM | RESPIRATION RATE: 16 BRPM | HEIGHT: 61 IN | WEIGHT: 188.2 LBS | DIASTOLIC BLOOD PRESSURE: 78 MMHG | TEMPERATURE: 96.6 F | BODY MASS INDEX: 35.53 KG/M2 | OXYGEN SATURATION: 98 %

## 2022-01-28 DIAGNOSIS — R53.83 FATIGUE, UNSPECIFIED TYPE: ICD-10-CM

## 2022-01-28 DIAGNOSIS — K21.9 GASTROESOPHAGEAL REFLUX DISEASE, UNSPECIFIED WHETHER ESOPHAGITIS PRESENT: ICD-10-CM

## 2022-01-28 DIAGNOSIS — L65.9 HAIR THINNING: ICD-10-CM

## 2022-01-28 DIAGNOSIS — R11.0 NAUSEA: ICD-10-CM

## 2022-01-28 DIAGNOSIS — M79.7 FIBROMYALGIA: ICD-10-CM

## 2022-01-28 DIAGNOSIS — F32.A DEPRESSION, UNSPECIFIED DEPRESSION TYPE: ICD-10-CM

## 2022-01-28 DIAGNOSIS — F51.01 PRIMARY INSOMNIA: Primary | ICD-10-CM

## 2022-01-28 LAB
BASOPHILS ABSOLUTE: 0.04 E9/L (ref 0–0.2)
BASOPHILS RELATIVE PERCENT: 0.4 % (ref 0–2)
EOSINOPHILS ABSOLUTE: 0.18 E9/L (ref 0.05–0.5)
EOSINOPHILS RELATIVE PERCENT: 1.9 % (ref 0–6)
HCT VFR BLD CALC: 41.6 % (ref 34–48)
HEMOGLOBIN: 13.7 G/DL (ref 11.5–15.5)
IMMATURE GRANULOCYTES #: 0.02 E9/L
IMMATURE GRANULOCYTES %: 0.2 % (ref 0–5)
LYMPHOCYTES ABSOLUTE: 2.74 E9/L (ref 1.5–4)
LYMPHOCYTES RELATIVE PERCENT: 29.4 % (ref 20–42)
MCH RBC QN AUTO: 30.9 PG (ref 26–35)
MCHC RBC AUTO-ENTMCNC: 32.9 % (ref 32–34.5)
MCV RBC AUTO: 93.7 FL (ref 80–99.9)
MONOCYTES ABSOLUTE: 0.58 E9/L (ref 0.1–0.95)
MONOCYTES RELATIVE PERCENT: 6.2 % (ref 2–12)
NEUTROPHILS ABSOLUTE: 5.75 E9/L (ref 1.8–7.3)
NEUTROPHILS RELATIVE PERCENT: 61.9 % (ref 43–80)
PDW BLD-RTO: 13.1 FL (ref 11.5–15)
PLATELET # BLD: 342 E9/L (ref 130–450)
PMV BLD AUTO: 10.6 FL (ref 7–12)
RBC # BLD: 4.44 E12/L (ref 3.5–5.5)
T4 FREE: 1.08 NG/DL (ref 0.93–1.7)
TSH SERPL DL<=0.05 MIU/L-ACNC: 3.29 UIU/ML (ref 0.27–4.2)
WBC # BLD: 9.3 E9/L (ref 4.5–11.5)

## 2022-01-28 PROCEDURE — 99214 OFFICE O/P EST MOD 30 MIN: CPT | Performed by: NURSE PRACTITIONER

## 2022-01-28 RX ORDER — DULOXETIN HYDROCHLORIDE 30 MG/1
30 CAPSULE, DELAYED RELEASE ORAL DAILY
Qty: 30 CAPSULE | Refills: 0 | Status: SHIPPED
Start: 2022-01-28 | End: 2022-02-28

## 2022-01-28 RX ORDER — ONDANSETRON 4 MG/1
TABLET, FILM COATED ORAL
Qty: 20 TABLET | Refills: 0 | Status: SHIPPED
Start: 2022-01-28 | End: 2022-03-11 | Stop reason: SDUPTHER

## 2022-01-28 RX ORDER — PANTOPRAZOLE SODIUM 40 MG/1
TABLET, DELAYED RELEASE ORAL
Qty: 30 TABLET | Refills: 0 | Status: SHIPPED
Start: 2022-01-28 | End: 2022-02-28

## 2022-01-28 RX ORDER — ZOLPIDEM TARTRATE 10 MG/1
10 TABLET ORAL NIGHTLY PRN
Qty: 30 TABLET | Refills: 0 | Status: SHIPPED
Start: 2022-01-28 | End: 2022-03-11 | Stop reason: SDUPTHER

## 2022-01-28 ASSESSMENT — ENCOUNTER SYMPTOMS
VOMITING: 0
SHORTNESS OF BREATH: 1
WHEEZING: 0
COUGH: 0
CONSTIPATION: 1
DIARRHEA: 0
NAUSEA: 1

## 2022-01-28 NOTE — PROGRESS NOTES
Sibyl Pallas (:  1980) is a 46 y.o. female,Established patient, here for evaluation of the following chief complaint(s): Insomnia (med refill), Anxiety (follow up), and Depression (follow up)         ASSESSMENT/PLAN:  1. Primary insomnia  -     zolpidem (AMBIEN) 10 MG tablet; Take 1 tablet by mouth nightly as needed for Sleep., Disp-30 tablet, R-0Normal  Prn only    2. Fatigue, unspecified type  -     T4, Free; Future  -     TSH without Reflex; Future  -     CBC Auto Differential; Future    3. Hair thinning  -     T4, Free; Future  -     TSH without Reflex; Future    4. Depression, unspecified depression type  -     DULoxetine (CYMBALTA) 30 MG extended release capsule; Take 1 capsule by mouth daily, Disp-30 capsule, R-0Normal  -call back in 10 to 14 days with update on symptoms  -do not abruptly stop taking medication  -contact information for Nancy Carrera CNP provided also    5. Fibromyalgia  -     DULoxetine (CYMBALTA) 30 MG extended release capsule; Take 1 capsule by mouth daily, Disp-30 capsule, R-0Normal  -will be starting PT    6. Gastroesophageal reflux disease, unspecified whether esophagitis present  -     pantoprazole (PROTONIX) 40 MG tablet; TAKE ONE TABLET BY MOUTH ONCE A DAY., Disp-30 tablet, R-0Normal  The current medical regimen is effective;  continue present plan and medications. 7. Nausea  -     ondansetron (ZOFRAN) 4 MG tablet; TAKE ONE TABLET BY MOUTH EVERY 8 HOURS AS NEEDED FOR NAUSEA OR VOMITING, Disp-20 tablet, R-0Normal    Controlled Substance Monitoring:    Acute and Chronic Pain Monitoring:   RX Monitoring 2022   Attestation -   Periodic Controlled Substance Monitoring No signs of potential drug abuse or diversion identified. Return if symptoms worsen or fail to improve. Subjective   SUBJECTIVE/OBJECTIVE:  Complains of insomnia. States still having difficulty staying asleep. She does try to only take half a tablet at times.     Complains of anxiety and depression. She does feel overwhelmed at times. She is carrying for her ill father, has 5 children at home and is working full time. States a few weeks ago she just could not get herself up to go to work. She does have chronic pain and was told by [facility] that she had fibromyalgia. Also has noticed her hair is falling out. She did see a mental health specialist approx 20 years ago. Review of Systems   Constitutional: Negative for activity change, appetite change, fatigue and unexpected weight change. Respiratory: Positive for shortness of breath (at times). Negative for cough and wheezing. Cardiovascular: Positive for palpitations. Negative for chest pain. Gastrointestinal: Positive for constipation and nausea. Negative for diarrhea and vomiting. Endocrine: Positive for cold intolerance. Negative for heat intolerance. Genitourinary: Negative for menstrual problem. Musculoskeletal: Positive for arthralgias and myalgias. Neurological: Positive for headaches. Negative for weakness and light-headedness. Psychiatric/Behavioral: Positive for decreased concentration, dysphoric mood and sleep disturbance. Negative for suicidal ideas. The patient is nervous/anxious. Objective   /78   Pulse 85   Temp 96.6 °F (35.9 °C) (Temporal)   Resp 16   Ht 5' 1\" (1.549 m)   Wt 188 lb 3.2 oz (85.4 kg)   SpO2 98%   BMI 35.56 kg/m²    Physical Exam  Constitutional:       General: She is not in acute distress. Appearance: Normal appearance. She is well-developed. HENT:      Head: Normocephalic and atraumatic. Neck:      Thyroid: No thyromegaly. Trachea: No tracheal deviation. Cardiovascular:      Rate and Rhythm: Normal rate and regular rhythm. Heart sounds: Normal heart sounds. No murmur heard. Pulmonary:      Effort: Pulmonary effort is normal. No respiratory distress. Breath sounds: Normal breath sounds. No wheezing or rales.    Chest: Chest wall: No tenderness. Abdominal:      General: Bowel sounds are normal.      Palpations: Abdomen is soft. Tenderness: There is no abdominal tenderness. Lymphadenopathy:      Cervical: No cervical adenopathy. Skin:     General: Skin is warm and dry. Neurological:      Mental Status: She is alert and oriented to person, place, and time. Psychiatric:         Mood and Affect: Mood normal.         Behavior: Behavior normal.            MDM moderate    An electronic signature was used to authenticate this note.     --Beatrice Mcgrathr, APRN - CNP

## 2022-02-28 DIAGNOSIS — F32.A DEPRESSION, UNSPECIFIED DEPRESSION TYPE: ICD-10-CM

## 2022-02-28 DIAGNOSIS — M79.7 FIBROMYALGIA: ICD-10-CM

## 2022-02-28 DIAGNOSIS — K21.9 GASTROESOPHAGEAL REFLUX DISEASE, UNSPECIFIED WHETHER ESOPHAGITIS PRESENT: ICD-10-CM

## 2022-02-28 RX ORDER — DULOXETIN HYDROCHLORIDE 30 MG/1
CAPSULE, DELAYED RELEASE ORAL
Qty: 30 CAPSULE | Refills: 2 | Status: SHIPPED
Start: 2022-02-28 | End: 2022-03-11

## 2022-02-28 RX ORDER — PANTOPRAZOLE SODIUM 40 MG/1
TABLET, DELAYED RELEASE ORAL
Qty: 30 TABLET | Refills: 2 | Status: SHIPPED
Start: 2022-02-28 | End: 2022-04-06 | Stop reason: SDUPTHER

## 2022-03-11 ENCOUNTER — OFFICE VISIT (OUTPATIENT)
Dept: FAMILY MEDICINE CLINIC | Age: 42
End: 2022-03-11
Payer: COMMERCIAL

## 2022-03-11 VITALS
WEIGHT: 186 LBS | HEART RATE: 96 BPM | HEIGHT: 61 IN | DIASTOLIC BLOOD PRESSURE: 78 MMHG | RESPIRATION RATE: 16 BRPM | BODY MASS INDEX: 35.12 KG/M2 | TEMPERATURE: 97.1 F | OXYGEN SATURATION: 99 % | SYSTOLIC BLOOD PRESSURE: 122 MMHG

## 2022-03-11 DIAGNOSIS — F51.01 PRIMARY INSOMNIA: ICD-10-CM

## 2022-03-11 DIAGNOSIS — H04.123 DRY EYES: ICD-10-CM

## 2022-03-11 DIAGNOSIS — R00.2 PALPITATIONS: ICD-10-CM

## 2022-03-11 DIAGNOSIS — R11.0 NAUSEA: ICD-10-CM

## 2022-03-11 DIAGNOSIS — Z13.220 LIPID SCREENING: ICD-10-CM

## 2022-03-11 DIAGNOSIS — M25.50 ARTHRALGIA, UNSPECIFIED JOINT: ICD-10-CM

## 2022-03-11 DIAGNOSIS — R53.83 FATIGUE, UNSPECIFIED TYPE: ICD-10-CM

## 2022-03-11 DIAGNOSIS — R68.2 DRY MOUTH: ICD-10-CM

## 2022-03-11 DIAGNOSIS — F32.A DEPRESSION, UNSPECIFIED DEPRESSION TYPE: Primary | ICD-10-CM

## 2022-03-11 PROCEDURE — 99214 OFFICE O/P EST MOD 30 MIN: CPT | Performed by: NURSE PRACTITIONER

## 2022-03-11 RX ORDER — DESVENLAFAXINE 25 MG/1
25 TABLET, EXTENDED RELEASE ORAL DAILY
Qty: 30 TABLET | Refills: 0 | Status: SHIPPED
Start: 2022-03-11 | End: 2022-03-29 | Stop reason: CLARIF

## 2022-03-11 RX ORDER — ONDANSETRON 4 MG/1
TABLET, FILM COATED ORAL
Qty: 20 TABLET | Refills: 0 | Status: SHIPPED
Start: 2022-03-11 | End: 2022-04-06 | Stop reason: SDUPTHER

## 2022-03-11 RX ORDER — ZOLPIDEM TARTRATE 10 MG/1
10 TABLET ORAL NIGHTLY PRN
Qty: 30 TABLET | Refills: 0 | Status: SHIPPED
Start: 2022-03-11 | End: 2022-04-06 | Stop reason: SDUPTHER

## 2022-03-11 ASSESSMENT — ENCOUNTER SYMPTOMS
COUGH: 0
WHEEZING: 0
DIARRHEA: 0
NAUSEA: 1
VOMITING: 0
CONSTIPATION: 0
SHORTNESS OF BREATH: 1
BACK PAIN: 1

## 2022-03-11 NOTE — PROGRESS NOTES
Rosaline Sosa (:  1980) is a 46 y.o. female,Established patient, here for evaluation of the following chief complaint(s): Insomnia (ambien refill), Nausea (zofran refill), and Depression (Stopped taking cymbalta after 1 month. It was causing dizziness, nausea, hot flashes, headaches, was unable to function. Did not answer depression screening except for was doing well with meds. )         ASSESSMENT/PLAN:  1. Depression, unspecified depression type  -     desvenlafaxine succinate (PRISTIQ) 25 MG TB24 extended release tablet; Take 1 tablet by mouth daily, Disp-30 tablet, R-0Normal  -call back in 10 to 14 days with update on symptoms  -do not abruptly stop taking medication    2. Primary insomnia  -     zolpidem (AMBIEN) 10 MG tablet; Take 1 tablet by mouth nightly as needed for Sleep., Disp-30 tablet, R-0Normal  The current medical regimen is effective;  continue present plan and medications. 3. Nausea  -     ondansetron (ZOFRAN) 4 MG tablet; TAKE ONE TABLET BY MOUTH EVERY 8 HOURS AS NEEDED FOR NAUSEA OR VOMITING, Disp-20 tablet, R-0Normal  Prn only    4. Arthralgia, unspecified joint  -     JOSSE; Future  -     C-Reactive Protein; Future  -     Rheumatoid Factor; Future  -     Sedimentation Rate; Future    5. Dry eyes  -     SJOGREN'S ANTIBODIES (SS-A, SS-B); Future  -     JOSSE; Future  -     C-Reactive Protein; Future  -     Rheumatoid Factor; Future  -     Sedimentation Rate; Future    6. Dry mouth  -     SJOGREN'S ANTIBODIES (SS-A, SS-B); Future  -     JOSSE; Future  -     C-Reactive Protein; Future  -     Rheumatoid Factor; Future  -     Sedimentation Rate; Future    7. Fatigue, unspecified type  -     CBC with Auto Differential; Future  -     Comprehensive Metabolic Panel; Future    8. Palpitations  -     CBC with Auto Differential; Future  -     Comprehensive Metabolic Panel; Future  -     Cardiac event monitor; Future    9. Lipid screening  -     Lipid Panel;  Future    Controlled Substance Monitoring:    Acute and Chronic Pain Monitoring:   RX Monitoring 3/11/2022   Attestation -   Periodic Controlled Substance Monitoring No signs of potential drug abuse or diversion identified. No follow-ups on file. Subjective   SUBJECTIVE/OBJECTIVE:  Complains of insomnia. Difficulty falling and staying asleep. Symptoms improved with ambien, no side effects noted. Complains of depression. Symptoms were improving on cymbalta but had side effects as noted so stopped. Saw eye doctor this am and was told she had inflammation in eyes and he suggested testing for autoimmune        Review of Systems   Constitutional: Positive for fatigue. Negative for appetite change and unexpected weight change. Activity change: limited due to pain. Respiratory: Positive for shortness of breath. Negative for cough and wheezing. Cardiovascular: Positive for palpitations. Negative for chest pain. Gastrointestinal: Positive for nausea. Negative for constipation, diarrhea and vomiting. Musculoskeletal: Positive for arthralgias and back pain. Neurological: Negative for weakness, light-headedness and headaches. Psychiatric/Behavioral: Positive for dysphoric mood and sleep disturbance. Negative for suicidal ideas. The patient is nervous/anxious. Objective   /78   Pulse 96   Temp 97.1 °F (36.2 °C) (Temporal)   Resp 16   Ht 5' 1\" (1.549 m)   Wt 186 lb (84.4 kg)   SpO2 99%   BMI 35.14 kg/m²    Physical Exam  Constitutional:       General: She is not in acute distress. Appearance: Normal appearance. She is well-developed. HENT:      Head: Normocephalic and atraumatic. Neck:      Thyroid: No thyromegaly. Trachea: No tracheal deviation. Cardiovascular:      Rate and Rhythm: Normal rate and regular rhythm. Heart sounds: No murmur heard. Pulmonary:      Effort: Pulmonary effort is normal.      Breath sounds: Normal breath sounds. No wheezing or rales.    Chest: Chest wall: No tenderness. Abdominal:      General: Bowel sounds are normal.      Palpations: Abdomen is soft. Tenderness: There is no abdominal tenderness. Lymphadenopathy:      Cervical: No cervical adenopathy. Skin:     General: Skin is warm and dry. Neurological:      Mental Status: She is alert and oriented to person, place, and time. Psychiatric:         Mood and Affect: Mood normal.         Behavior: Behavior normal.            [facility] moderate      An electronic signature was used to authenticate this note.     --Kaitlin Clinton, APRDALY - CNP

## 2022-03-14 DIAGNOSIS — R00.2 PALPITATIONS: ICD-10-CM

## 2022-03-14 DIAGNOSIS — Z13.220 LIPID SCREENING: ICD-10-CM

## 2022-03-14 DIAGNOSIS — R53.83 FATIGUE, UNSPECIFIED TYPE: ICD-10-CM

## 2022-03-14 DIAGNOSIS — H04.123 DRY EYES: ICD-10-CM

## 2022-03-14 DIAGNOSIS — M25.50 ARTHRALGIA, UNSPECIFIED JOINT: ICD-10-CM

## 2022-03-14 DIAGNOSIS — R68.2 DRY MOUTH: ICD-10-CM

## 2022-03-14 LAB
ALBUMIN SERPL-MCNC: 4 G/DL (ref 3.5–5.2)
ALP BLD-CCNC: 87 U/L (ref 35–104)
ALT SERPL-CCNC: 32 U/L (ref 0–32)
ANION GAP SERPL CALCULATED.3IONS-SCNC: 14 MMOL/L (ref 7–16)
AST SERPL-CCNC: 24 U/L (ref 0–31)
BASOPHILS ABSOLUTE: 0.05 E9/L (ref 0–0.2)
BASOPHILS RELATIVE PERCENT: 0.6 % (ref 0–2)
BILIRUB SERPL-MCNC: 0.3 MG/DL (ref 0–1.2)
BUN BLDV-MCNC: 13 MG/DL (ref 6–20)
C-REACTIVE PROTEIN: 0.4 MG/DL (ref 0–0.4)
CALCIUM SERPL-MCNC: 9.1 MG/DL (ref 8.6–10.2)
CHLORIDE BLD-SCNC: 104 MMOL/L (ref 98–107)
CHOLESTEROL, TOTAL: 232 MG/DL (ref 0–199)
CO2: 22 MMOL/L (ref 22–29)
CREAT SERPL-MCNC: 0.8 MG/DL (ref 0.5–1)
EOSINOPHILS ABSOLUTE: 0.2 E9/L (ref 0.05–0.5)
EOSINOPHILS RELATIVE PERCENT: 2.5 % (ref 0–6)
GFR AFRICAN AMERICAN: >60
GFR NON-AFRICAN AMERICAN: >60 ML/MIN/1.73
GLUCOSE BLD-MCNC: 91 MG/DL (ref 74–99)
HCT VFR BLD CALC: 44.1 % (ref 34–48)
HDLC SERPL-MCNC: 34 MG/DL
HEMOGLOBIN: 13.9 G/DL (ref 11.5–15.5)
IMMATURE GRANULOCYTES #: 0.03 E9/L
IMMATURE GRANULOCYTES %: 0.4 % (ref 0–5)
LDL CHOLESTEROL CALCULATED: 152 MG/DL (ref 0–99)
LYMPHOCYTES ABSOLUTE: 2.54 E9/L (ref 1.5–4)
LYMPHOCYTES RELATIVE PERCENT: 32.2 % (ref 20–42)
MCH RBC QN AUTO: 30.3 PG (ref 26–35)
MCHC RBC AUTO-ENTMCNC: 31.5 % (ref 32–34.5)
MCV RBC AUTO: 96.3 FL (ref 80–99.9)
MONOCYTES ABSOLUTE: 0.57 E9/L (ref 0.1–0.95)
MONOCYTES RELATIVE PERCENT: 7.2 % (ref 2–12)
NEUTROPHILS ABSOLUTE: 4.49 E9/L (ref 1.8–7.3)
NEUTROPHILS RELATIVE PERCENT: 57.1 % (ref 43–80)
PDW BLD-RTO: 13.7 FL (ref 11.5–15)
PLATELET # BLD: 296 E9/L (ref 130–450)
PMV BLD AUTO: 11 FL (ref 7–12)
POTASSIUM SERPL-SCNC: 4.5 MMOL/L (ref 3.5–5)
RBC # BLD: 4.58 E12/L (ref 3.5–5.5)
RHEUMATOID FACTOR: <10 IU/ML (ref 0–13)
SEDIMENTATION RATE, ERYTHROCYTE: 9 MM/HR (ref 0–20)
SODIUM BLD-SCNC: 140 MMOL/L (ref 132–146)
TOTAL PROTEIN: 6.8 G/DL (ref 6.4–8.3)
TRIGL SERPL-MCNC: 229 MG/DL (ref 0–149)
VLDLC SERPL CALC-MCNC: 46 MG/DL
WBC # BLD: 7.9 E9/L (ref 4.5–11.5)

## 2022-03-15 LAB
ANTI-NUCLEAR ANTIBODY (ANA): NEGATIVE
ENA TO SSA (RO) ANTIBODY: NEGATIVE
ENA TO SSB (LA) ANTIBODY: NEGATIVE

## 2022-03-16 ENCOUNTER — TELEPHONE (OUTPATIENT)
Dept: NON INVASIVE DIAGNOSTICS | Age: 42
End: 2022-03-16

## 2022-03-16 NOTE — TELEPHONE ENCOUNTER
MA spoke with Pt and verified address to mail [employer]. Order placed with [facility].      Electronically signed by Joanna Foote MA on 3/16/2022 at 12:53 PM

## 2022-03-29 ENCOUNTER — OFFICE VISIT (OUTPATIENT)
Dept: FAMILY MEDICINE CLINIC | Age: 42
End: 2022-03-29
Payer: COMMERCIAL

## 2022-03-29 VITALS
DIASTOLIC BLOOD PRESSURE: 80 MMHG | HEART RATE: 94 BPM | HEIGHT: 61 IN | WEIGHT: 185 LBS | TEMPERATURE: 97 F | BODY MASS INDEX: 34.93 KG/M2 | RESPIRATION RATE: 16 BRPM | OXYGEN SATURATION: 99 % | SYSTOLIC BLOOD PRESSURE: 124 MMHG

## 2022-03-29 DIAGNOSIS — F41.9 ANXIETY: ICD-10-CM

## 2022-03-29 DIAGNOSIS — L30.9 DERMATITIS: Primary | ICD-10-CM

## 2022-03-29 DIAGNOSIS — F32.A DEPRESSION, UNSPECIFIED DEPRESSION TYPE: ICD-10-CM

## 2022-03-29 PROCEDURE — 99213 OFFICE O/P EST LOW 20 MIN: CPT | Performed by: NURSE PRACTITIONER

## 2022-03-29 RX ORDER — METHYLPREDNISOLONE 4 MG/1
TABLET ORAL
Qty: 1 KIT | Refills: 0 | Status: SHIPPED
Start: 2022-03-29 | End: 2022-04-06 | Stop reason: ALTCHOICE

## 2022-03-29 ASSESSMENT — PATIENT HEALTH QUESTIONNAIRE - PHQ9
SUM OF ALL RESPONSES TO PHQ QUESTIONS 1-9: 7
8. MOVING OR SPEAKING SO SLOWLY THAT OTHER PEOPLE COULD HAVE NOTICED. OR THE OPPOSITE, BEING SO FIGETY OR RESTLESS THAT YOU HAVE BEEN MOVING AROUND A LOT MORE THAN USUAL: 1
SUM OF ALL RESPONSES TO PHQ QUESTIONS 1-9: 7
SUM OF ALL RESPONSES TO PHQ9 QUESTIONS 1 & 2: 2
2. FEELING DOWN, DEPRESSED OR HOPELESS: 1
6. FEELING BAD ABOUT YOURSELF - OR THAT YOU ARE A FAILURE OR HAVE LET YOURSELF OR YOUR FAMILY DOWN: 0
SUM OF ALL RESPONSES TO PHQ QUESTIONS 1-9: 6
7. TROUBLE CONCENTRATING ON THINGS, SUCH AS READING THE NEWSPAPER OR WATCHING TELEVISION: 1
3. TROUBLE FALLING OR STAYING ASLEEP: 1
4. FEELING TIRED OR HAVING LITTLE ENERGY: 1
9. THOUGHTS THAT YOU WOULD BE BETTER OFF DEAD, OR OF HURTING YOURSELF: 0
10. IF YOU CHECKED OFF ANY PROBLEMS, HOW DIFFICULT HAVE THESE PROBLEMS MADE IT FOR YOU TO DO YOUR WORK, TAKE CARE OF THINGS AT HOME, OR GET ALONG WITH OTHER PEOPLE: 1
9. THOUGHTS THAT YOU WOULD BE BETTER OFF DEAD, OR OF HURTING YOURSELF: 0
2. FEELING DOWN, DEPRESSED OR HOPELESS: 1
SUM OF ALL RESPONSES TO PHQ9 QUESTIONS 1 & 2: 2
4. FEELING TIRED OR HAVING LITTLE ENERGY: 1
5. POOR APPETITE OR OVEREATING: 1
SUM OF ALL RESPONSES TO PHQ QUESTIONS 1-9: 6
6. FEELING BAD ABOUT YOURSELF - OR THAT YOU ARE A FAILURE OR HAVE LET YOURSELF OR YOUR FAMILY DOWN: 0
3. TROUBLE FALLING OR STAYING ASLEEP: 1
SUM OF ALL RESPONSES TO PHQ QUESTIONS 1-9: 7
10. IF YOU CHECKED OFF ANY PROBLEMS, HOW DIFFICULT HAVE THESE PROBLEMS MADE IT FOR YOU TO DO YOUR WORK, TAKE CARE OF THINGS AT HOME, OR GET ALONG WITH OTHER PEOPLE: 1
8. MOVING OR SPEAKING SO SLOWLY THAT OTHER PEOPLE COULD HAVE NOTICED. OR THE OPPOSITE, BEING SO FIGETY OR RESTLESS THAT YOU HAVE BEEN MOVING AROUND A LOT MORE THAN USUAL: 1
1. LITTLE INTEREST OR PLEASURE IN DOING THINGS: 1
SUM OF ALL RESPONSES TO PHQ QUESTIONS 1-9: 7
SUM OF ALL RESPONSES TO PHQ QUESTIONS 1-9: 6
7. TROUBLE CONCENTRATING ON THINGS, SUCH AS READING THE NEWSPAPER OR WATCHING TELEVISION: 1
SUM OF ALL RESPONSES TO PHQ QUESTIONS 1-9: 6
1. LITTLE INTEREST OR PLEASURE IN DOING THINGS: 1

## 2022-03-29 ASSESSMENT — ENCOUNTER SYMPTOMS
COUGH: 0
NAUSEA: 1
DIARRHEA: 0
CONSTIPATION: 0
SHORTNESS OF BREATH: 1
VOMITING: 0
WHEEZING: 0

## 2022-03-29 NOTE — PROGRESS NOTES
Tal Khan (:  1980) is a 46 y.o. female,Established patient, here for evaluation of the following chief complaint(s):  Rash (since starting pristiq. Itchy. continued after quitting pristiq.) and Discuss Medications (quit pristiq. )         ASSESSMENT/PLAN:  1. Dermatitis  -     methylPREDNISolone (MEDROL, AMOR,) 4 MG tablet; Take by mouth as directed, Disp-1 kit, R-0Normal  Contact office if symptoms do not improve as expected or worsen. 2. Depression, unspecified depression type  -will contact Cathie Gan CNP to discuss genetic testing since multiple medication failures    3. Anxiety  -will contact Cathie Gan CNP to discuss genetic testing since multiple medication failures      No follow-ups on file. Subjective   SUBJECTIVE/OBJECTIVE:  States had itching before starting pristiqu but rash started after taking pristiq. Rash is intermittent on different body parts. Rash last up to several hours. Benadryl does seem to help, takes zyrtec daily. No history of eczema. Last dose of pristiq approx 2 weeks ago. Rash starts before itching per patient. No new hygiene or laundry products. Review of Systems   Constitutional: Positive for fatigue. Negative for activity change, appetite change and unexpected weight change. Respiratory: Positive for shortness of breath. Negative for cough and wheezing. Cardiovascular: Positive for palpitations. Negative for chest pain. Gastrointestinal: Positive for nausea. Negative for constipation, diarrhea and vomiting. Skin: Positive for rash. Neurological: Negative for weakness, light-headedness and headaches. Psychiatric/Behavioral: Positive for dysphoric mood and sleep disturbance (difficulty falling and staying asleep). Negative for suicidal ideas. The patient is nervous/anxious.            Objective   /80   Pulse 94   Temp 97 °F (36.1 °C) (Temporal)   Resp 16   Ht 5' 1\" (1.549 m)   Wt 185 lb (83.9 kg)   SpO2 99% BMI 34.96 kg/m²    Physical Exam  Constitutional:       General: She is not in acute distress. Appearance: Normal appearance. She is well-developed. HENT:      Head: Normocephalic and atraumatic. Neck:      Thyroid: No thyromegaly. Trachea: No tracheal deviation. Cardiovascular:      Rate and Rhythm: Normal rate and regular rhythm. Heart sounds: No murmur heard. Pulmonary:      Effort: Pulmonary effort is normal.      Breath sounds: Normal breath sounds. No wheezing or rales. Chest:      Chest wall: No tenderness. Abdominal:      General: Bowel sounds are normal.      Palpations: Abdomen is soft. Tenderness: There is no abdominal tenderness. Lymphadenopathy:      Cervical: No cervical adenopathy. Skin:     General: Skin is warm and dry. Findings: Rash present. Comments: Erythematous macular rash mainly in linear pattern to bilateral arms. Neg dermatographism   Neurological:      Mental Status: She is alert and oriented to person, place, and time. Psychiatric:         Mood and Affect: Mood normal.         Behavior: Behavior normal.            [facility] low      An electronic signature was used to authenticate this note.     --Viry Wilkins, APRN - CNP

## 2022-04-05 ENCOUNTER — APPOINTMENT (OUTPATIENT)
Dept: GENERAL RADIOLOGY | Age: 42
End: 2022-04-05
Payer: COMMERCIAL

## 2022-04-05 ENCOUNTER — HOSPITAL ENCOUNTER (EMERGENCY)
Age: 42
Discharge: HOME | End: 2022-04-05
Attending: EMERGENCY MEDICINE
Payer: COMMERCIAL

## 2022-04-05 VITALS
RESPIRATION RATE: 14 BRPM | HEART RATE: 86 BPM | DIASTOLIC BLOOD PRESSURE: 68 MMHG | OXYGEN SATURATION: 99 % | SYSTOLIC BLOOD PRESSURE: 122 MMHG | TEMPERATURE: 98.2 F

## 2022-04-05 DIAGNOSIS — R07.9 CHEST PAIN, UNSPECIFIED TYPE: Primary | ICD-10-CM

## 2022-04-05 LAB
ALBUMIN SERPL-MCNC: 3.8 G/DL (ref 3.5–5.2)
ALP BLD-CCNC: 87 U/L (ref 35–104)
ALT SERPL-CCNC: 23 U/L (ref 0–32)
ANION GAP SERPL CALCULATED.3IONS-SCNC: 8 MMOL/L (ref 7–16)
AST SERPL-CCNC: 16 U/L (ref 0–31)
BASOPHILS ABSOLUTE: 0.06 E9/L (ref 0–0.2)
BASOPHILS RELATIVE PERCENT: 0.6 % (ref 0–2)
BILIRUB SERPL-MCNC: 0.4 MG/DL (ref 0–1.2)
BILIRUBIN DIRECT: <0.2 MG/DL (ref 0–0.3)
BILIRUBIN, INDIRECT: ABNORMAL MG/DL (ref 0–1)
BUN BLDV-MCNC: 12 MG/DL (ref 6–20)
CALCIUM SERPL-MCNC: 8.7 MG/DL (ref 8.6–10.2)
CHLORIDE BLD-SCNC: 105 MMOL/L (ref 98–107)
CO2: 25 MMOL/L (ref 22–29)
CREAT SERPL-MCNC: 0.8 MG/DL (ref 0.5–1)
EKG ATRIAL RATE: 76 BPM
EKG P AXIS: 27 DEGREES
EKG P-R INTERVAL: 130 MS
EKG Q-T INTERVAL: 396 MS
EKG QRS DURATION: 68 MS
EKG QTC CALCULATION (BAZETT): 445 MS
EKG R AXIS: 56 DEGREES
EKG T AXIS: 21 DEGREES
EKG VENTRICULAR RATE: 76 BPM
EOSINOPHILS ABSOLUTE: 0.35 E9/L (ref 0.05–0.5)
EOSINOPHILS RELATIVE PERCENT: 3.6 % (ref 0–6)
GFR AFRICAN AMERICAN: >60
GFR NON-AFRICAN AMERICAN: >60 ML/MIN/1.73
GLUCOSE BLD-MCNC: 93 MG/DL (ref 74–99)
HCG, URINE, POC: NEGATIVE
HCT VFR BLD CALC: 43.5 % (ref 34–48)
HEMOGLOBIN: 14.1 G/DL (ref 11.5–15.5)
IMMATURE GRANULOCYTES #: 0.04 E9/L
IMMATURE GRANULOCYTES %: 0.4 % (ref 0–5)
LIPASE: 43 U/L (ref 13–60)
LYMPHOCYTES ABSOLUTE: 3.51 E9/L (ref 1.5–4)
LYMPHOCYTES RELATIVE PERCENT: 35.7 % (ref 20–42)
Lab: NORMAL
MCH RBC QN AUTO: 30.9 PG (ref 26–35)
MCHC RBC AUTO-ENTMCNC: 32.4 % (ref 32–34.5)
MCV RBC AUTO: 95.2 FL (ref 80–99.9)
MONOCYTES ABSOLUTE: 0.7 E9/L (ref 0.1–0.95)
MONOCYTES RELATIVE PERCENT: 7.1 % (ref 2–12)
NEGATIVE QC PASS/FAIL: NORMAL
NEUTROPHILS ABSOLUTE: 5.16 E9/L (ref 1.8–7.3)
NEUTROPHILS RELATIVE PERCENT: 52.6 % (ref 43–80)
PDW BLD-RTO: 13.6 FL (ref 11.5–15)
PLATELET # BLD: 303 E9/L (ref 130–450)
PMV BLD AUTO: 10 FL (ref 7–12)
POSITIVE QC PASS/FAIL: NORMAL
POTASSIUM REFLEX MAGNESIUM: 3.8 MMOL/L (ref 3.5–5)
RBC # BLD: 4.57 E12/L (ref 3.5–5.5)
SODIUM BLD-SCNC: 138 MMOL/L (ref 132–146)
TOTAL PROTEIN: 6.3 G/DL (ref 6.4–8.3)
TROPONIN, HIGH SENSITIVITY: <6 NG/L (ref 0–9)
WBC # BLD: 9.8 E9/L (ref 4.5–11.5)

## 2022-04-05 PROCEDURE — 84484 ASSAY OF TROPONIN QUANT: CPT

## 2022-04-05 PROCEDURE — 99284 EMERGENCY DEPT VISIT MOD MDM: CPT

## 2022-04-05 PROCEDURE — 80076 HEPATIC FUNCTION PANEL: CPT

## 2022-04-05 PROCEDURE — 80048 BASIC METABOLIC PNL TOTAL CA: CPT

## 2022-04-05 PROCEDURE — 6370000000 HC RX 637 (ALT 250 FOR IP): Performed by: STUDENT IN AN ORGANIZED HEALTH CARE EDUCATION/TRAINING PROGRAM

## 2022-04-05 PROCEDURE — 6370000000 HC RX 637 (ALT 250 FOR IP): Performed by: EMERGENCY MEDICINE

## 2022-04-05 PROCEDURE — 85025 COMPLETE CBC W/AUTO DIFF WBC: CPT

## 2022-04-05 PROCEDURE — 71045 X-RAY EXAM CHEST 1 VIEW: CPT

## 2022-04-05 PROCEDURE — 93005 ELECTROCARDIOGRAM TRACING: CPT | Performed by: EMERGENCY MEDICINE

## 2022-04-05 PROCEDURE — 83690 ASSAY OF LIPASE: CPT

## 2022-04-05 PROCEDURE — 93010 ELECTROCARDIOGRAM REPORT: CPT | Performed by: INTERNAL MEDICINE

## 2022-04-05 PROCEDURE — 36415 COLL VENOUS BLD VENIPUNCTURE: CPT

## 2022-04-05 RX ORDER — ASPIRIN 81 MG/1
324 TABLET, CHEWABLE ORAL ONCE
Status: COMPLETED | OUTPATIENT
Start: 2022-04-05 | End: 2022-04-05

## 2022-04-05 RX ADMIN — LIDOCAINE HYDROCHLORIDE: 20 SOLUTION ORAL; TOPICAL at 07:10

## 2022-04-05 RX ADMIN — ASPIRIN 81 MG 324 MG: 81 TABLET ORAL at 06:24

## 2022-04-05 ASSESSMENT — ENCOUNTER SYMPTOMS
WHEEZING: 0
ABDOMINAL DISTENTION: 0
EYE REDNESS: 0
SHORTNESS OF BREATH: 0
EYE DISCHARGE: 0
DIARRHEA: 0
COUGH: 0
BACK PAIN: 0
SORE THROAT: 0
VOMITING: 0
EYE PAIN: 0
NAUSEA: 0
SINUS PRESSURE: 0

## 2022-04-05 ASSESSMENT — PAIN - FUNCTIONAL ASSESSMENT: PAIN_FUNCTIONAL_ASSESSMENT: 0-10

## 2022-04-05 ASSESSMENT — PAIN SCALES - GENERAL: PAINLEVEL_OUTOF10: 4

## 2022-04-05 NOTE — ED PROVIDER NOTES
79-year-old female presents emerged department complaint chest pain. Been going on for several weeks. Is been following with her family care doctor for palpitations. Describes the pain as a pressure-like sensation left chest no radiation of the pain. States the pain comes on out of nowhere. Last anywhere from minutes to hours. She states that due to the ongoing pain she wanted to make sure nothing was going on with her heart. She denies any fevers chills. Does elicit some slight shortness of breath when it happens. Denies any nausea vomiting. Does have very mild chest pain currently. Does not identify anything makes the pain better or worse. Otherwise no other acute complaints at this time denies abdominal pain change in bowel or bladder habits. The history is provided by the patient, medical records and the spouse. The patient presents with chest pain that has been going on for 2 weeks . These symptoms are mild in severity. Symptoms are made better by nothing. Symptoms are made worse by nothnig. Associated symptoms include none. Review of Systems   Constitutional: Negative for chills and fever. HENT: Negative for ear pain, sinus pressure and sore throat. Eyes: Negative for pain, discharge and redness. Respiratory: Negative for cough, shortness of breath and wheezing. Cardiovascular: Positive for chest pain. Gastrointestinal: Negative for abdominal distention, diarrhea, nausea and vomiting. Genitourinary: Negative for dysuria and frequency. Musculoskeletal: Negative for arthralgias and back pain. Skin: Negative for rash and wound. Neurological: Negative for weakness and headaches. Hematological: Negative for adenopathy. All other systems reviewed and are negative. Physical Exam  Vitals and nursing note reviewed. Constitutional:       General: She is not in acute distress. Appearance: Normal appearance. She is normal weight. She is not toxic-appearing. HENT:      Head: Normocephalic and atraumatic. Eyes:      General: No scleral icterus. Conjunctiva/sclera: Conjunctivae normal.   Cardiovascular:      Rate and Rhythm: Normal rate and regular rhythm. Pulses: Normal pulses. Heart sounds: Normal heart sounds. No murmur heard. No gallop. Pulmonary:      Effort: Pulmonary effort is normal. No respiratory distress. Breath sounds: Normal breath sounds. No wheezing or rales. Abdominal:      General: Abdomen is flat. Bowel sounds are normal. There is no distension. Palpations: Abdomen is soft. Tenderness: There is no abdominal tenderness. There is no guarding. Musculoskeletal:         General: No swelling or deformity. Skin:     General: Skin is warm and dry. Capillary Refill: Capillary refill takes less than 2 seconds. Coloration: Skin is not jaundiced. Neurological:      General: No focal deficit present. Mental Status: She is alert and oriented to person, place, and time. Psychiatric:         Mood and Affect: Mood normal.         Thought Content: Thought content normal.          Procedures     MDM     42-year-old female presents emergency department complaint of chest pain middle of chest no radiation. Patient's EKG was stable no acute ST elevation or depression. Troponin less than 6. Remaining laboratory work-up unremarkable. Patient given aspirin as well as Maalox. Patient safe to be discharged home at this time and follow-up with her family doctor. She remained he medically stable here. Patient safe for discharge from the emergency department. Did advise on return precautions to the emergency department. Did also advise follow-up with her primary care physician. Patient agreeable with the plan moving forward. EKG:  This EKG is signed by emergency department physician.     Rate: 76  Rhythm: Sinus  Interpretation: No acute ST elevation depression sinus rhythm normal axis  Comparison: no previous EKG available            --------------------------------------------- PAST HISTORY ---------------------------------------------  Past Medical History:  has a past medical history of Anxiety, Kidney stone, and New onset headache. Past Surgical History:  has a past surgical history that includes Tonsillectomy; Cystoscopy (12/12/2016); and Lithotripsy. Social History:  reports that she has never smoked. She has never used smokeless tobacco. She reports that she does not drink alcohol and does not use drugs. Family History: family history is not on file. The patients home medications have been reviewed.     Allergies: Codeine    -------------------------------------------------- RESULTS -------------------------------------------------  Labs:  Results for orders placed or performed during the hospital encounter of 04/05/22   CBC with Auto Differential   Result Value Ref Range    WBC 9.8 4.5 - 11.5 E9/L    RBC 4.57 3.50 - 5.50 E12/L    Hemoglobin 14.1 11.5 - 15.5 g/dL    Hematocrit 43.5 34.0 - 48.0 %    MCV 95.2 80.0 - 99.9 fL    MCH 30.9 26.0 - 35.0 pg    MCHC 32.4 32.0 - 34.5 %    RDW 13.6 11.5 - 15.0 fL    Platelets 981 004 - 194 E9/L    MPV 10.0 7.0 - 12.0 fL    Neutrophils % 52.6 43.0 - 80.0 %    Immature Granulocytes % 0.4 0.0 - 5.0 %    Lymphocytes % 35.7 20.0 - 42.0 %    Monocytes % 7.1 2.0 - 12.0 %    Eosinophils % 3.6 0.0 - 6.0 %    Basophils % 0.6 0.0 - 2.0 %    Neutrophils Absolute 5.16 1.80 - 7.30 E9/L    Immature Granulocytes # 0.04 E9/L    Lymphocytes Absolute 3.51 1.50 - 4.00 E9/L    Monocytes Absolute 0.70 0.10 - 0.95 E9/L    Eosinophils Absolute 0.35 0.05 - 0.50 E9/L    Basophils Absolute 0.06 0.00 - 0.20 D2/I   Basic Metabolic Panel w/ Reflex to MG   Result Value Ref Range    Sodium 138 132 - 146 mmol/L    Potassium reflex Magnesium 3.8 3.5 - 5.0 mmol/L    Chloride 105 98 - 107 mmol/L    CO2 25 22 - 29 mmol/L    Anion Gap 8 7 - 16 mmol/L    Glucose 93 74 - 99 mg/dL    BUN 12 6 - 20 mg/dL    CREATININE 0.8 0.5 - 1.0 mg/dL    GFR Non-African American >60 >=60 mL/min/1.73    GFR African American >60     Calcium 8.7 8.6 - 10.2 mg/dL   Troponin   Result Value Ref Range    Troponin, High Sensitivity <6 0 - 9 ng/L   Hepatic Function Panel   Result Value Ref Range    Total Protein 6.3 (L) 6.4 - 8.3 g/dL    Albumin 3.8 3.5 - 5.2 g/dL    Alkaline Phosphatase 87 35 - 104 U/L    ALT 23 0 - 32 U/L    AST 16 0 - 31 U/L    Total Bilirubin 0.4 0.0 - 1.2 mg/dL    Bilirubin, Direct <0.2 0.0 - 0.3 mg/dL    Bilirubin, Indirect see below 0.0 - 1.0 mg/dL   Lipase   Result Value Ref Range    Lipase 43 13 - 60 U/L   POC Pregnancy Urine   Result Value Ref Range    HCG, Urine, POC Negative Negative    Lot Number [id number]     Positive QC Pass/Fail Pass     Negative QC Pass/Fail Pass        Radiology:  XR CHEST PORTABLE   Final Result   No acute cardiopulmonary findings. PA and lateral views would be useful for   further assessment, if symptoms persist.             ------------------------- NURSING NOTES AND VITALS REVIEWED ---------------------------  Date / Time Roomed:  4/5/2022  5:52 AM  ED Bed Assignment:  03/03    The nursing notes within the ED encounter and vital signs as below have been reviewed. /87   Pulse 85   Temp 98.2 °F (36.8 °C) (Temporal)   Resp 16   SpO2 99%   Oxygen Saturation Interpretation: Normal      ------------------------------------------ PROGRESS NOTES ------------------------------------------  6:04 AM EDT  I have spoken with the patient and discussed todays results, in addition to providing specific details for the plan of care and counseling regarding the diagnosis and prognosis. Their questions are answered at this time and they are agreeable with the plan. I discussed at length with them reasons for immediate return here for re evaluation.  They will followup with their primary care physician by calling their office on Monday.      --------------------------------- ADDITIONAL PROVIDER NOTES ---------------------------------  At this time the patient is without objective evidence of an acute process requiring hospitalization or inpatient management. They have remained hemodynamically stable throughout their entire ED visit and are stable for discharge with outpatient follow-up. The plan has been discussed in detail and they are aware of the specific conditions for emergent return, as well as the importance of follow-up. New Prescriptions    No medications on file       Diagnosis:  1. Chest pain, unspecified type        Disposition:  Patient's disposition: Discharge to home  Patient's condition is stable. Joey Sales MD  Resident  04/05/22 [id number]      ATTENDING PROVIDER ATTESTATION:     I have personally performed and/or participated in the history, exam, medical decision making, and procedures and agree with all pertinent clinical information. I have also reviewed and agree with the past medical, family and social history unless otherwise noted. I have discussed this patient in detail with the resident, and provided the instruction and education regarding chest pain, shortness of breath and acute coronary syndrome. My findings/Plan: Heart RRR. Lungs CTA bilaterally. Abdomen soft. Nontender. Bowel sounds normal. Supportive care. Discharge for outpatient follow up.          [facility],   05/03/22 7511

## 2022-04-06 ENCOUNTER — OFFICE VISIT (OUTPATIENT)
Dept: FAMILY MEDICINE CLINIC | Age: 42
End: 2022-04-06
Payer: COMMERCIAL

## 2022-04-06 VITALS
BODY MASS INDEX: 34.93 KG/M2 | TEMPERATURE: 97 F | HEIGHT: 61 IN | OXYGEN SATURATION: 99 % | RESPIRATION RATE: 16 BRPM | WEIGHT: 185 LBS | SYSTOLIC BLOOD PRESSURE: 118 MMHG | DIASTOLIC BLOOD PRESSURE: 62 MMHG | HEART RATE: 93 BPM

## 2022-04-06 DIAGNOSIS — F51.01 PRIMARY INSOMNIA: ICD-10-CM

## 2022-04-06 DIAGNOSIS — K21.9 GASTROESOPHAGEAL REFLUX DISEASE, UNSPECIFIED WHETHER ESOPHAGITIS PRESENT: ICD-10-CM

## 2022-04-06 DIAGNOSIS — F32.A DEPRESSION, UNSPECIFIED DEPRESSION TYPE: Primary | ICD-10-CM

## 2022-04-06 DIAGNOSIS — F41.9 ANXIETY: ICD-10-CM

## 2022-04-06 DIAGNOSIS — R11.0 NAUSEA: ICD-10-CM

## 2022-04-06 PROCEDURE — 99214 OFFICE O/P EST MOD 30 MIN: CPT | Performed by: NURSE PRACTITIONER

## 2022-04-06 RX ORDER — ONDANSETRON 4 MG/1
TABLET, FILM COATED ORAL
Qty: 20 TABLET | Refills: 0 | Status: SHIPPED | OUTPATIENT
Start: 2022-04-06

## 2022-04-06 RX ORDER — ZOLPIDEM TARTRATE 10 MG/1
10 TABLET ORAL NIGHTLY PRN
Qty: 30 TABLET | Refills: 0 | Status: SHIPPED
Start: 2022-04-06 | End: 2022-08-16 | Stop reason: SDUPTHER

## 2022-04-06 RX ORDER — DULOXETIN HYDROCHLORIDE 20 MG/1
20 CAPSULE, DELAYED RELEASE ORAL DAILY
Qty: 30 CAPSULE | Refills: 0 | Status: SHIPPED
Start: 2022-04-06 | End: 2022-08-16

## 2022-04-06 RX ORDER — PANTOPRAZOLE SODIUM 40 MG/1
TABLET, DELAYED RELEASE ORAL
Qty: 30 TABLET | Refills: 2 | Status: SHIPPED | OUTPATIENT
Start: 2022-04-06

## 2022-04-06 ASSESSMENT — ENCOUNTER SYMPTOMS
CONSTIPATION: 0
COUGH: 1
WHEEZING: 0
SHORTNESS OF BREATH: 0
ABDOMINAL PAIN: 1
NAUSEA: 1
DIARRHEA: 0
VOMITING: 0

## 2022-04-06 NOTE — PROGRESS NOTES
Maria E Christianson (:  1980) is a 46 y.o. female,Established patient, here for evaluation of the following chief complaint(s):  Chest Pain (seen in ED yesterday. today doesn't feel great. ), Insomnia (ambien refill), and Discuss Medications (discuss restarting pristiq. )         ASSESSMENT/PLAN:  1. Depression, unspecified depression type  -     DULoxetine (CYMBALTA) 20 MG extended release capsule; Take 1 capsule by mouth daily, Disp-30 capsule, R-0Normal  -restart at low dose, call if headaches return  Will contact Rosalie Shah CNP for consult  -call back in 10 to 14 days with update on symptoms  -do not abruptly stop taking medication    2. Primary insomnia  -     zolpidem (AMBIEN) 10 MG tablet; Take 1 tablet by mouth nightly as needed for Sleep., Disp-30 tablet, R-0Normal  The current medical regimen is effective;  continue present plan and medications. 3. Gastroesophageal reflux disease, unspecified whether esophagitis present  -     pantoprazole (PROTONIX) 40 MG tablet; TAKE ONE TABLET BY MOUTH EVERY DAY, Disp-30 tablet, R-2Normal  Add gaviscon prn  Contact office if symptoms do not improve as expected or worsen. .    4. Nausea  -     ondansetron (ZOFRAN) 4 MG tablet; TAKE ONE TABLET BY MOUTH EVERY 8 HOURS AS NEEDED FOR NAUSEA OR VOMITING, Disp-20 tablet, R-0Normal  Prn only    5. Anxiety  -     DULoxetine (CYMBALTA) 20 MG extended release capsule; Take 1 capsule by mouth daily, Disp-30 capsule, R-0Normal  -restart at low dose, call if headaches return  Will contact Rosalie Shah CNP for consult  -call back in 10 to 14 days with update on symptoms  -do not abruptly stop taking medication      Controlled Substance Monitoring:    Acute and Chronic Pain Monitoring:   RX Monitoring 2022   Attestation -   Periodic Controlled Substance Monitoring No signs of potential drug abuse or diversion identified. No follow-ups on file.          Subjective   SUBJECTIVE/OBJECTIVE:  Patient went to [facility] due to chest pain 4/5. States pain feels like it is under breasts, mid chest and radiates around to back. States feels full and not able to eat. Saw GI last year and had an upper and lower scope. Stopped protonix recently while on steroids. States had dark stool on Sunday and no bowel movement since then. Complains of difficulty falling and staying asleep. States worse since not feeling well. Anxiety has been high recently. Has not noticed any rash or hives since completing the steroids. Has not scheduled with Leon Betts CNP yet. Review of Systems   Constitutional: Positive for appetite change and fatigue. Negative for activity change and unexpected weight change. Respiratory: Positive for cough (yesterday). Negative for shortness of breath and wheezing. Cardiovascular: Positive for palpitations (occasionally). Negative for chest pain. Gastrointestinal: Positive for abdominal pain and nausea. Negative for constipation, diarrhea and vomiting. Neurological: Positive for light-headedness (occasionally). Negative for weakness and headaches. Psychiatric/Behavioral: Positive for dysphoric mood and sleep disturbance. Negative for suicidal ideas. The patient is nervous/anxious. Objective   /62   Pulse 93   Temp 97 °F (36.1 °C) (Temporal)   Resp 16   Ht 5' 1\" (1.549 m)   Wt 185 lb (83.9 kg)   SpO2 99%   BMI 34.96 kg/m²    Physical Exam  Constitutional:       General: She is not in acute distress. Appearance: Normal appearance. She is well-developed. HENT:      Head: Normocephalic and atraumatic. Neck:      Thyroid: No thyromegaly. Trachea: No tracheal deviation. Cardiovascular:      Rate and Rhythm: Normal rate and regular rhythm. Heart sounds: No murmur heard. Pulmonary:      Effort: Pulmonary effort is normal.      Breath sounds: Normal breath sounds. No wheezing or rales. Chest:      Chest wall: No tenderness.    Abdominal: General: Bowel sounds are normal. There is no distension. Palpations: Abdomen is soft. There is no mass. Tenderness: There is abdominal tenderness. There is no guarding or rebound. Comments: Epigastric, LLQ (minimal)   Lymphadenopathy:      Cervical: No cervical adenopathy. Skin:     General: Skin is warm and dry. Neurological:      Mental Status: She is alert and oriented to person, place, and time. Psychiatric:         Mood and Affect: Mood normal.         Behavior: Behavior normal.            [facility] moderate      An electronic signature was used to authenticate this note.     --Thalia Lopez, DANIELE - CNP

## 2022-04-08 DIAGNOSIS — R00.2 PALPITATIONS: ICD-10-CM

## 2022-06-18 ENCOUNTER — HOSPITAL ENCOUNTER (EMERGENCY)
Age: 42
Discharge: HOME | End: 2022-06-18
Payer: COMMERCIAL

## 2022-06-18 VITALS
TEMPERATURE: 97.7 F | WEIGHT: 180 LBS | OXYGEN SATURATION: 100 % | SYSTOLIC BLOOD PRESSURE: 133 MMHG | BODY MASS INDEX: 34.01 KG/M2 | RESPIRATION RATE: 16 BRPM | DIASTOLIC BLOOD PRESSURE: 98 MMHG | HEART RATE: 96 BPM

## 2022-06-18 DIAGNOSIS — J01.90 ACUTE SINUSITIS, RECURRENCE NOT SPECIFIED, UNSPECIFIED LOCATION: ICD-10-CM

## 2022-06-18 DIAGNOSIS — H66.90 ACUTE OTITIS MEDIA, UNSPECIFIED OTITIS MEDIA TYPE: Primary | ICD-10-CM

## 2022-06-18 PROCEDURE — 99211 OFF/OP EST MAY X REQ PHY/QHP: CPT

## 2022-06-18 RX ORDER — AMOXICILLIN AND CLAVULANATE POTASSIUM 875; 125 MG/1; MG/1
1 TABLET, FILM COATED ORAL 2 TIMES DAILY
Qty: 14 TABLET | Refills: 0 | Status: SHIPPED | OUTPATIENT
Start: 2022-06-18 | End: 2022-06-25

## 2022-06-18 ASSESSMENT — PAIN - FUNCTIONAL ASSESSMENT: PAIN_FUNCTIONAL_ASSESSMENT: 0-10

## 2022-06-18 ASSESSMENT — PAIN SCALES - GENERAL: PAINLEVEL_OUTOF10: 0

## 2022-06-18 NOTE — ED PROVIDER NOTES
Department of Emergency Medicine   [facility]  Provider Note  Admit Date/RoomTime: 2022  2:21 PM  Room:   NAME: Farhat Wilson  : 1980  MRN: [id number]     Chief Complaint:  Otalgia (thought it started like allergies, ears ache now) and Sinusitis    History of Present Illness       Farhat Wilson is a 46 y.o. old female with a past medical history of:   Past Medical History:   Diagnosis Date    Anxiety     Kidney stone     New onset headache 2017   ,who presents to urgent care center with complaint of Right ear pain. Said she has had sinus congestion and pressure for weeks and  now has yellow mucus coming from her sinuses. And now she has right ear pain. She does not have a fever she is not complaining of sore throat cough chest pain body aches or any other complaints. ROS    Pertinent positives and negatives are stated within HPI, all other systems reviewed and are negative. Past Surgical History:  has a past surgical history that includes Tonsillectomy; Cystoscopy (2016); and Lithotripsy. Social History:  reports that she has never smoked. She has never used smokeless tobacco. She reports that she does not drink alcohol and does not use drugs. Family History: family history is not on file. Allergies: Codeine    Physical Exam   Oxygen Saturation Interpretation: Normal.        ED Triage Vitals [22 1423]   BP Temp Temp src Heart Rate Resp SpO2 Height Weight   (!) 133/98 97.7 °F (36.5 °C) -- 96 16 100 % -- 180 lb (81.6 kg)         Constitutional:  Alert, development consistent with age. Ears:  External Ears: Bilateral normal.                 TM's & External Canals:  abnormal TM right ear - bulging, abnormal TM left ear - retracted. Nose:   There is no abnormalities present  Throat:  Pharynx without injection, exudate, or tonsillar hypertrophy. Airway patient. Neck:  Normal ROM. Supple.   Respiratory:  Clear to auscultation and breath sounds equal.    CV: Regular rate and rhythm, normal heart sounds, without pathological murmurs, ectopy, gallops, or rubs. Skin:  No rashes, erythema present, unless noted elsewhere. Lymphatic: No lymphangitis or adenopathy noted. Neurological:  Oriented. Motor functions intact. Lab / Imaging Results   (All laboratory and radiology results have been personally reviewed by myself)  Labs:  No results found for this visit on 06/18/22. Imaging: All Radiology results interpreted by Radiologist unless otherwise noted. No orders to display     ED Course / Medical Decision Making   Medications - No data to display       Assessment      1. Acute otitis media, unspecified otitis media type    2. Acute sinusitis, recurrence not specified, unspecified location      Plan   Discharge to home and advised to contact Nik[facility] Powell [place] [phone]      As needed   Patient condition is good    New Medications     New Prescriptions    AMOXICILLIN-CLAVULANATE (AUGMENTIN) 875-125 MG PER TABLET    Take 1 tablet by mouth 2 times daily for 7 days     Electronically signed by DANIELE Diamond CNP   DD: 6/18/22  **This report was transcribed using voice recognition software. Every effort was made to ensure accuracy; however, inadvertent computerized transcription errors may be present.   END OF ED PROVIDER NOTE     DANIELE Diamond CNP  06/18/22 [id number]

## 2022-08-16 ENCOUNTER — OFFICE VISIT (OUTPATIENT)
Dept: FAMILY MEDICINE CLINIC | Age: 42
End: 2022-08-16
Payer: COMMERCIAL

## 2022-08-16 VITALS
OXYGEN SATURATION: 98 % | WEIGHT: 182 LBS | SYSTOLIC BLOOD PRESSURE: 104 MMHG | BODY MASS INDEX: 35.73 KG/M2 | HEART RATE: 86 BPM | HEIGHT: 60 IN | TEMPERATURE: 96.7 F | DIASTOLIC BLOOD PRESSURE: 70 MMHG | RESPIRATION RATE: 18 BRPM

## 2022-08-16 DIAGNOSIS — R53.83 FATIGUE, UNSPECIFIED TYPE: ICD-10-CM

## 2022-08-16 DIAGNOSIS — J06.9 VIRAL URI: Primary | ICD-10-CM

## 2022-08-16 DIAGNOSIS — R09.81 CONGESTION OF NASAL SINUS: ICD-10-CM

## 2022-08-16 DIAGNOSIS — F51.01 PRIMARY INSOMNIA: ICD-10-CM

## 2022-08-16 LAB
Lab: NORMAL
PERFORMING INSTRUMENT: NORMAL
QC PASS/FAIL: NORMAL
SARS-COV-2, POC: NORMAL

## 2022-08-16 PROCEDURE — 99213 OFFICE O/P EST LOW 20 MIN: CPT | Performed by: NURSE PRACTITIONER

## 2022-08-16 PROCEDURE — 87426 SARSCOV CORONAVIRUS AG IA: CPT | Performed by: NURSE PRACTITIONER

## 2022-08-16 RX ORDER — BROMPHENIRAMINE MALEATE, PSEUDOEPHEDRINE HYDROCHLORIDE, AND DEXTROMETHORPHAN HYDROBROMIDE 2; 30; 10 MG/5ML; MG/5ML; MG/5ML
5 SYRUP ORAL 4 TIMES DAILY PRN
Qty: 120 ML | Refills: 0 | Status: SHIPPED | OUTPATIENT
Start: 2022-08-16

## 2022-08-16 RX ORDER — ZOLPIDEM TARTRATE 10 MG/1
10 TABLET ORAL NIGHTLY PRN
Qty: 30 TABLET | Refills: 0 | Status: SHIPPED | OUTPATIENT
Start: 2022-08-16 | End: 2023-08-22

## 2022-08-16 SDOH — ECONOMIC STABILITY: FOOD INSECURITY: WITHIN THE PAST 12 MONTHS, YOU WORRIED THAT YOUR FOOD WOULD RUN OUT BEFORE YOU GOT MONEY TO BUY MORE.: NEVER TRUE

## 2022-08-16 SDOH — ECONOMIC STABILITY: FOOD INSECURITY: WITHIN THE PAST 12 MONTHS, THE FOOD YOU BOUGHT JUST DIDN'T LAST AND YOU DIDN'T HAVE MONEY TO GET MORE.: NEVER TRUE

## 2022-08-16 ASSESSMENT — PATIENT HEALTH QUESTIONNAIRE - PHQ9
6. FEELING BAD ABOUT YOURSELF - OR THAT YOU ARE A FAILURE OR HAVE LET YOURSELF OR YOUR FAMILY DOWN: 0
2. FEELING DOWN, DEPRESSED OR HOPELESS: 1
8. MOVING OR SPEAKING SO SLOWLY THAT OTHER PEOPLE COULD HAVE NOTICED. OR THE OPPOSITE, BEING SO FIGETY OR RESTLESS THAT YOU HAVE BEEN MOVING AROUND A LOT MORE THAN USUAL: 0
SUM OF ALL RESPONSES TO PHQ QUESTIONS 1-9: 6
9. THOUGHTS THAT YOU WOULD BE BETTER OFF DEAD, OR OF HURTING YOURSELF: 0
5. POOR APPETITE OR OVEREATING: 0
3. TROUBLE FALLING OR STAYING ASLEEP: 0
SUM OF ALL RESPONSES TO PHQ QUESTIONS 1-9: 6
4. FEELING TIRED OR HAVING LITTLE ENERGY: 1
1. LITTLE INTEREST OR PLEASURE IN DOING THINGS: 2
SUM OF ALL RESPONSES TO PHQ QUESTIONS 1-9: 6
SUM OF ALL RESPONSES TO PHQ QUESTIONS 1-9: 6
SUM OF ALL RESPONSES TO PHQ9 QUESTIONS 1 & 2: 3
10. IF YOU CHECKED OFF ANY PROBLEMS, HOW DIFFICULT HAVE THESE PROBLEMS MADE IT FOR YOU TO DO YOUR WORK, TAKE CARE OF THINGS AT HOME, OR GET ALONG WITH OTHER PEOPLE: 1
7. TROUBLE CONCENTRATING ON THINGS, SUCH AS READING THE NEWSPAPER OR WATCHING TELEVISION: 2

## 2022-08-16 ASSESSMENT — ENCOUNTER SYMPTOMS
COUGH: 1
SINUS PAIN: 1
SHORTNESS OF BREATH: 1
CONSTIPATION: 0
DIARRHEA: 0
SORE THROAT: 0
WHEEZING: 0
VOMITING: 0
SINUS PRESSURE: 1
NAUSEA: 0

## 2022-08-16 ASSESSMENT — SOCIAL DETERMINANTS OF HEALTH (SDOH): HOW HARD IS IT FOR YOU TO PAY FOR THE VERY BASICS LIKE FOOD, HOUSING, MEDICAL CARE, AND HEATING?: NOT HARD AT ALL

## 2022-08-16 NOTE — PROGRESS NOTES
Aretha Lozano (:  1980) is a 38 y.o. female,Established patient, here for evaluation of the following chief complaint(s):  Cough, Congestion (Runny stuffy nose), and Fatigue (And pressure. Took 2 at home covid tests Friday and both negative)         ASSESSMENT/PLAN:  1. Viral URI  -     brompheniramine-pseudoephedrine-DM (BROMFED DM) 2-30-10 MG/5ML syrup; Take 5 mLs by mouth 4 times daily as needed for Congestion or Cough, Disp-120 mL, R-0Normal  Contact office if symptoms do not improve as expected or worsen. 2. Fatigue, unspecified type  -     POCT COVID-19, Antigen  3. Congestion of nasal sinus  -     POCT COVID-19, Antigen      No follow-ups on file. Subjective   SUBJECTIVE/OBJECTIVE:  Complains congestion, cough (clear), sinus pressure, fever, tiredness, body aches. Onset 7 days ago. Sore throat improved but other symptoms still present. Has taken ibuprofen, mucinex with slight relief. Slight improvement today. Review of Systems   Constitutional:  Positive for fatigue and fever. Negative for chills and diaphoresis. HENT:  Positive for congestion, ear pain (clogged feeling), sinus pressure and sinus pain. Negative for sore throat. Respiratory:  Positive for cough and shortness of breath (CUELLAR). Negative for wheezing. Cardiovascular:  Positive for palpitations. Negative for chest pain. Gastrointestinal:  Negative for constipation, diarrhea, nausea and vomiting. Musculoskeletal:  Positive for myalgias. Skin:  Negative for rash. Neurological:  Positive for headaches. Negative for dizziness and weakness. Objective   /70   Pulse 86   Temp (!) 96.7 °F (35.9 °C)   Resp 18   Ht 5' (1.524 m)   Wt 182 lb (82.6 kg)   SpO2 98%   BMI 35.54 kg/m²    Physical Exam  Constitutional:       General: She is not in acute distress. Appearance: Normal appearance. She is well-developed. HENT:      Head: Normocephalic and atraumatic.       Ears:      Comments: Ear canals clear, TMs intact and pearly gray, nasal turbinates erythematous, pharynx shows mild erythema and post nasal drip      Neck:      Thyroid: No thyromegaly. Trachea: No tracheal deviation. Cardiovascular:      Rate and Rhythm: Normal rate and regular rhythm. Heart sounds: Normal heart sounds. No murmur heard. Pulmonary:      Effort: Pulmonary effort is normal. No respiratory distress. Breath sounds: Normal breath sounds. No wheezing, rhonchi or rales. Chest:      Chest wall: No tenderness. Abdominal:      General: Bowel sounds are normal.      Palpations: Abdomen is soft. Tenderness: There is no abdominal tenderness. Lymphadenopathy:      Cervical: No cervical adenopathy. Skin:     General: Skin is warm and dry. Neurological:      Mental Status: She is alert and oriented to person, place, and time. Psychiatric:         Mood and Affect: Mood normal.         Behavior: Behavior normal.          [facility]      An electronic signature was used to authenticate this note.     --DANIELE Orozco - CNP

## 2022-11-30 ENCOUNTER — OFFICE VISIT (OUTPATIENT)
Dept: FAMILY MEDICINE CLINIC | Age: 42
End: 2022-11-30
Payer: COMMERCIAL

## 2022-11-30 VITALS
HEART RATE: 74 BPM | BODY MASS INDEX: 36.56 KG/M2 | RESPIRATION RATE: 18 BRPM | OXYGEN SATURATION: 98 % | SYSTOLIC BLOOD PRESSURE: 116 MMHG | TEMPERATURE: 97.2 F | WEIGHT: 186.2 LBS | DIASTOLIC BLOOD PRESSURE: 70 MMHG | HEIGHT: 60 IN

## 2022-11-30 DIAGNOSIS — M54.42 CHRONIC BILATERAL LOW BACK PAIN WITH LEFT-SIDED SCIATICA: Primary | ICD-10-CM

## 2022-11-30 DIAGNOSIS — K21.9 GASTROESOPHAGEAL REFLUX DISEASE, UNSPECIFIED WHETHER ESOPHAGITIS PRESENT: ICD-10-CM

## 2022-11-30 DIAGNOSIS — M54.9 MID BACK PAIN ON RIGHT SIDE: ICD-10-CM

## 2022-11-30 DIAGNOSIS — F51.01 PRIMARY INSOMNIA: ICD-10-CM

## 2022-11-30 DIAGNOSIS — G89.29 CHRONIC BILATERAL LOW BACK PAIN WITH LEFT-SIDED SCIATICA: Primary | ICD-10-CM

## 2022-11-30 PROCEDURE — 99213 OFFICE O/P EST LOW 20 MIN: CPT | Performed by: NURSE PRACTITIONER

## 2022-11-30 RX ORDER — ZOLPIDEM TARTRATE 10 MG/1
10 TABLET ORAL NIGHTLY PRN
Qty: 30 TABLET | Refills: 0 | Status: SHIPPED | OUTPATIENT
Start: 2022-11-30 | End: 2023-12-06

## 2022-11-30 RX ORDER — CYCLOBENZAPRINE HCL 5 MG
5 TABLET ORAL 2 TIMES DAILY PRN
Qty: 10 TABLET | Refills: 0 | Status: SHIPPED | OUTPATIENT
Start: 2022-11-30 | End: 2022-12-10

## 2022-11-30 RX ORDER — IBUPROFEN 800 MG/1
800 TABLET ORAL EVERY 8 HOURS PRN
Qty: 90 TABLET | Refills: 0 | Status: SHIPPED | OUTPATIENT
Start: 2022-11-30

## 2022-11-30 RX ORDER — PANTOPRAZOLE SODIUM 40 MG/1
TABLET, DELAYED RELEASE ORAL
Qty: 30 TABLET | Refills: 2 | Status: SHIPPED | OUTPATIENT
Start: 2022-11-30

## 2022-11-30 ASSESSMENT — PATIENT HEALTH QUESTIONNAIRE - PHQ9
SUM OF ALL RESPONSES TO PHQ9 QUESTIONS 1 & 2: 0
SUM OF ALL RESPONSES TO PHQ QUESTIONS 1-9: 0
1. LITTLE INTEREST OR PLEASURE IN DOING THINGS: 0
2. FEELING DOWN, DEPRESSED OR HOPELESS: 0
SUM OF ALL RESPONSES TO PHQ QUESTIONS 1-9: 9
SUM OF ALL RESPONSES TO PHQ QUESTIONS 1-9: 0
SUM OF ALL RESPONSES TO PHQ9 QUESTIONS 1 & 2: 0
9. THOUGHTS THAT YOU WOULD BE BETTER OFF DEAD, OR OF HURTING YOURSELF: 0
SUM OF ALL RESPONSES TO PHQ QUESTIONS 1-9: 0
8. MOVING OR SPEAKING SO SLOWLY THAT OTHER PEOPLE COULD HAVE NOTICED. OR THE OPPOSITE, BEING SO FIGETY OR RESTLESS THAT YOU HAVE BEEN MOVING AROUND A LOT MORE THAN USUAL: 0
2. FEELING DOWN, DEPRESSED OR HOPELESS: 0
4. FEELING TIRED OR HAVING LITTLE ENERGY: 2
SUM OF ALL RESPONSES TO PHQ QUESTIONS 1-9: 9
SUM OF ALL RESPONSES TO PHQ QUESTIONS 1-9: 9
3. TROUBLE FALLING OR STAYING ASLEEP: 2
SUM OF ALL RESPONSES TO PHQ QUESTIONS 1-9: 0
7. TROUBLE CONCENTRATING ON THINGS, SUCH AS READING THE NEWSPAPER OR WATCHING TELEVISION: 3
5. POOR APPETITE OR OVEREATING: 2
SUM OF ALL RESPONSES TO PHQ QUESTIONS 1-9: 9
10. IF YOU CHECKED OFF ANY PROBLEMS, HOW DIFFICULT HAVE THESE PROBLEMS MADE IT FOR YOU TO DO YOUR WORK, TAKE CARE OF THINGS AT HOME, OR GET ALONG WITH OTHER PEOPLE: 1
1. LITTLE INTEREST OR PLEASURE IN DOING THINGS: 0
6. FEELING BAD ABOUT YOURSELF - OR THAT YOU ARE A FAILURE OR HAVE LET YOURSELF OR YOUR FAMILY DOWN: 0

## 2022-11-30 ASSESSMENT — ENCOUNTER SYMPTOMS
WHEEZING: 0
COUGH: 0
BACK PAIN: 1
CONSTIPATION: 0
DIARRHEA: 0
NAUSEA: 1
SHORTNESS OF BREATH: 0
VOMITING: 0

## 2022-11-30 NOTE — PROGRESS NOTES
Stacie Gutierrez (:  1980) is a 38 y.o. female,Established patient, here for evaluation of the following chief complaint(s):  Back Pain (Feels like she pulled a muscle in her back 2 nights ago; couldn't more her right arm for a bit; bending over is the most pain;) and Wrist Pain (Right wrist pain unknown cause)         ASSESSMENT/PLAN:  1. Chronic bilateral low back pain with left-sided sciatica  -     cyclobenzaprine (FLEXERIL) 5 MG tablet; Take 1 tablet by mouth 2 times daily as needed for Muscle spasms, Disp-10 tablet, R-0Normal  -     ibuprofen (ADVIL;MOTRIN) 800 MG tablet; Take 1 tablet by mouth every 8 hours as needed for Pain, Disp-90 tablet, R-0Normal  Low back exercises given  Contact office if symptoms do not improve as expected or worsen. 2. Primary insomnia  -     zolpidem (AMBIEN) 10 MG tablet; Take 1 tablet by mouth nightly as needed for Sleep., Disp-30 tablet, R-0Normal  The current medical regimen is effective;  continue present plan and medications. 3. Gastroesophageal reflux disease, unspecified whether esophagitis present  -     pantoprazole (PROTONIX) 40 MG tablet; TAKE ONE TABLET BY MOUTH EVERY DAY, Disp-30 tablet, R-2Normal  The current medical regimen is effective;  continue present plan and medications. 4. Mid back pain on right side  -     cyclobenzaprine (FLEXERIL) 5 MG tablet; Take 1 tablet by mouth 2 times daily as needed for Muscle spasms, Disp-10 tablet, R-0Normal  -     ibuprofen (ADVIL;MOTRIN) 800 MG tablet; Take 1 tablet by mouth every 8 hours as needed for Pain, Disp-90 tablet, R-0Normal  Contact office if symptoms do not improve as expected or worsen. Controlled Substance Monitoring:    Acute and Chronic Pain Monitoring:   RX Monitoring 2022   Attestation -   Periodic Controlled Substance Monitoring No signs of potential drug abuse or diversion identified. Return if symptoms worsen or fail to improve.          Subjective SUBJECTIVE/OBJECTIVE:  Complains of pain to mid back right side for past 2 days. States when leaning over dad to care for him her back has been painful. Pain radiates down right arm and around right side at times. Low back pain radiates to hips and down left leg. She did have a dose of a muscle relaxer at home and it helped with the pain. Complains of right wrist pain with movement for past 2 days. Not sure if injured lifting her dad. Review of Systems   Constitutional:  Negative for activity change, appetite change, fatigue and unexpected weight change. Respiratory:  Negative for cough, shortness of breath and wheezing. Cardiovascular:  Positive for chest pain and palpitations. Intermittent with anxiety   Gastrointestinal:  Positive for nausea. Negative for constipation, diarrhea and vomiting. Musculoskeletal:  Positive for arthralgias, back pain and myalgias. Neurological:  Positive for headaches. Negative for weakness and light-headedness. Psychiatric/Behavioral:  Positive for dysphoric mood and sleep disturbance. Negative for suicidal ideas. The patient is nervous/anxious. Objective   /70   Pulse 74   Temp 97.2 °F (36.2 °C) (Temporal)   Resp 18   Ht 5' (1.524 m)   Wt 186 lb 3.2 oz (84.5 kg)   SpO2 98%   BMI 36.36 kg/m²    Physical Exam  Constitutional:       General: She is not in acute distress. Appearance: Normal appearance. She is well-developed. HENT:      Head: Normocephalic and atraumatic. Neck:      Thyroid: No thyromegaly. Trachea: No tracheal deviation. Cardiovascular:      Rate and Rhythm: Normal rate and regular rhythm. Heart sounds: Normal heart sounds. No murmur heard. Pulmonary:      Effort: Pulmonary effort is normal. No respiratory distress. Breath sounds: Normal breath sounds. No wheezing, rhonchi or rales. Chest:      Chest wall: No tenderness.    Abdominal:      General: Bowel sounds are normal.      Palpations: Abdomen is soft. Tenderness: There is no abdominal tenderness. Musculoskeletal:         General: Tenderness present. Comments: Tenderness to lumbar spine with bilateral paraspinal tenderness, L>R. ROM limited due to pain. Negative SLR bilateral. Upper and lower extremities equal and strong. Tenderness and spasms to right subscapular musculature   Lymphadenopathy:      Cervical: No cervical adenopathy. Skin:     General: Skin is warm and dry. Neurological:      Mental Status: She is alert and oriented to person, place, and time. Psychiatric:         Mood and Affect: Mood normal.         Behavior: Behavior normal.          [facility]      An electronic signature was used to authenticate this note.     --Sebastian Arevalo, APRN - CNP

## 2022-12-06 ENCOUNTER — PATIENT MESSAGE (OUTPATIENT)
Dept: FAMILY MEDICINE CLINIC | Age: 42
End: 2022-12-06

## 2022-12-06 NOTE — TELEPHONE ENCOUNTER
From: Leno Johns  To: Ruben Parra  Sent: 12/6/2022 2:30 PM EST  Subject: back pain    Hi Remigionaina Riley, I was in last week due to the pulled muscle in my back. I'm having pain on that right side that radiates under my ribs and feels like alot of pressure. I was wondering if I might have kidney stones again. Is there anyway you could request an X-ray/CT scan for that right sided pain to see what is going on? I didn't want to have to sit in the ER since I was just seen in your office for the same thing.

## 2022-12-07 ENCOUNTER — OFFICE VISIT (OUTPATIENT)
Dept: FAMILY MEDICINE CLINIC | Age: 42
End: 2022-12-07
Payer: COMMERCIAL

## 2022-12-07 VITALS
RESPIRATION RATE: 18 BRPM | WEIGHT: 187.2 LBS | OXYGEN SATURATION: 98 % | DIASTOLIC BLOOD PRESSURE: 66 MMHG | BODY MASS INDEX: 35.34 KG/M2 | HEART RATE: 78 BPM | HEIGHT: 61 IN | SYSTOLIC BLOOD PRESSURE: 124 MMHG | TEMPERATURE: 98.2 F

## 2022-12-07 DIAGNOSIS — M54.9 MID BACK PAIN ON RIGHT SIDE: ICD-10-CM

## 2022-12-07 DIAGNOSIS — R07.89 COSTOCHONDRAL PAIN: Primary | ICD-10-CM

## 2022-12-07 LAB
BILIRUBIN, POC: NEGATIVE
BLOOD URINE, POC: NEGATIVE
CLARITY, POC: NORMAL
COLOR, POC: NORMAL
GLUCOSE URINE, POC: NEGATIVE
KETONES, POC: NEGATIVE
LEUKOCYTE EST, POC: NEGATIVE
NITRITE, POC: NEGATIVE
PH, POC: 7
PROTEIN, POC: NEGATIVE
SPECIFIC GRAVITY, POC: 1.02
UROBILINOGEN, POC: 0.2

## 2022-12-07 PROCEDURE — 81002 URINALYSIS NONAUTO W/O SCOPE: CPT | Performed by: NURSE PRACTITIONER

## 2022-12-07 PROCEDURE — 99213 OFFICE O/P EST LOW 20 MIN: CPT | Performed by: NURSE PRACTITIONER

## 2022-12-07 RX ORDER — METHYLPREDNISOLONE 4 MG/1
TABLET ORAL
Qty: 1 KIT | Refills: 0 | Status: SHIPPED | OUTPATIENT
Start: 2022-12-07

## 2022-12-07 ASSESSMENT — PATIENT HEALTH QUESTIONNAIRE - PHQ9
1. LITTLE INTEREST OR PLEASURE IN DOING THINGS: 0
10. IF YOU CHECKED OFF ANY PROBLEMS, HOW DIFFICULT HAVE THESE PROBLEMS MADE IT FOR YOU TO DO YOUR WORK, TAKE CARE OF THINGS AT HOME, OR GET ALONG WITH OTHER PEOPLE: 0
SUM OF ALL RESPONSES TO PHQ QUESTIONS 1-9: 0
8. MOVING OR SPEAKING SO SLOWLY THAT OTHER PEOPLE COULD HAVE NOTICED. OR THE OPPOSITE, BEING SO FIGETY OR RESTLESS THAT YOU HAVE BEEN MOVING AROUND A LOT MORE THAN USUAL: 0
7. TROUBLE CONCENTRATING ON THINGS, SUCH AS READING THE NEWSPAPER OR WATCHING TELEVISION: 0
4. FEELING TIRED OR HAVING LITTLE ENERGY: 0
SUM OF ALL RESPONSES TO PHQ QUESTIONS 1-9: 0
5. POOR APPETITE OR OVEREATING: 0
SUM OF ALL RESPONSES TO PHQ QUESTIONS 1-9: 0
9. THOUGHTS THAT YOU WOULD BE BETTER OFF DEAD, OR OF HURTING YOURSELF: 0
2. FEELING DOWN, DEPRESSED OR HOPELESS: 0
3. TROUBLE FALLING OR STAYING ASLEEP: 0
SUM OF ALL RESPONSES TO PHQ QUESTIONS 1-9: 0
SUM OF ALL RESPONSES TO PHQ9 QUESTIONS 1 & 2: 0
6. FEELING BAD ABOUT YOURSELF - OR THAT YOU ARE A FAILURE OR HAVE LET YOURSELF OR YOUR FAMILY DOWN: 0

## 2022-12-07 ASSESSMENT — ENCOUNTER SYMPTOMS
DIARRHEA: 0
CONSTIPATION: 0
WHEEZING: 0
COUGH: 0
SHORTNESS OF BREATH: 0
NAUSEA: 1
VOMITING: 0
BACK PAIN: 1

## 2022-12-07 NOTE — PROGRESS NOTES
Demian Mcclelland (:  1980) is a 38 y.o. female,Established patient, here for evaluation of the following chief complaint(s):  Back Pain         ASSESSMENT/PLAN:  1. Costochondral pain  -     methylPREDNISolone (MEDROL, AMOR,) 4 MG tablet; Take by mouth as directed, Disp-1 kit, R-0Normal  Support area with pillow, deep breathing encouraged  Rest, caring for ill father  Call with new or worsening of symptoms    2. Mid back pain on right side  -     POCT Urinalysis no Micro  No hematuria, not likely kidney stones    Return if symptoms worsen or fail to improve. Subjective   SUBJECTIVE/OBJECTIVE:  Complains of pain to right side of mid back radiating to right side. No relief with flexeril. Pain worse when sitting, pressure under ribs. History of kidney stones. Review of Systems   Constitutional:  Negative for activity change, appetite change, chills, diaphoresis, fatigue, fever and unexpected weight change. Respiratory:  Negative for cough, shortness of breath and wheezing. Cardiovascular:  Negative for chest pain and palpitations. Gastrointestinal:  Positive for nausea. Negative for constipation, diarrhea and vomiting. Genitourinary:  Negative for dysuria, frequency and hematuria. Musculoskeletal:  Positive for back pain and myalgias. Neurological:  Positive for headaches. Negative for weakness and light-headedness. Psychiatric/Behavioral:  The patient is nervous/anxious. Objective   /66   Pulse 78   Temp 98.2 °F (36.8 °C) (Temporal)   Resp 18   Ht 5' 1\" (1.549 m)   Wt 187 lb 3.2 oz (84.9 kg)   SpO2 98%   BMI 35.37 kg/m²    Physical Exam  Constitutional:       General: She is not in acute distress. Appearance: Normal appearance. She is well-developed. HENT:      Head: Normocephalic and atraumatic. Neck:      Thyroid: No thyromegaly. Trachea: No tracheal deviation. Cardiovascular:      Rate and Rhythm: Normal rate and regular rhythm.       Heart sounds: Normal heart sounds. No murmur heard. Pulmonary:      Effort: Pulmonary effort is normal. No respiratory distress. Breath sounds: Normal breath sounds. No wheezing, rhonchi or rales. Chest:      Chest wall: Tenderness (right lateral/anterior costochodral pain) present. Abdominal:      General: Bowel sounds are normal.      Palpations: Abdomen is soft. Tenderness: There is no abdominal tenderness. There is no right CVA tenderness or left CVA tenderness. Lymphadenopathy:      Cervical: No cervical adenopathy. Skin:     General: Skin is warm and dry. Neurological:      Mental Status: She is alert and oriented to person, place, and time. Psychiatric:         Mood and Affect: Mood normal.         Behavior: Behavior normal.          [facility] low      An electronic signature was used to authenticate this note.     --DANIELE Schumacher - CNP

## 2023-03-14 ENCOUNTER — OFFICE VISIT (OUTPATIENT)
Dept: FAMILY MEDICINE CLINIC | Age: 43
End: 2023-03-14
Payer: COMMERCIAL

## 2023-03-14 VITALS
BODY MASS INDEX: 36.79 KG/M2 | RESPIRATION RATE: 14 BRPM | OXYGEN SATURATION: 97 % | WEIGHT: 187.4 LBS | TEMPERATURE: 97 F | HEART RATE: 87 BPM | DIASTOLIC BLOOD PRESSURE: 64 MMHG | SYSTOLIC BLOOD PRESSURE: 120 MMHG | HEIGHT: 60 IN

## 2023-03-14 DIAGNOSIS — F51.01 PRIMARY INSOMNIA: ICD-10-CM

## 2023-03-14 DIAGNOSIS — M54.9 MID BACK PAIN ON RIGHT SIDE: ICD-10-CM

## 2023-03-14 DIAGNOSIS — R11.0 NAUSEA: ICD-10-CM

## 2023-03-14 DIAGNOSIS — M54.42 CHRONIC BILATERAL LOW BACK PAIN WITH LEFT-SIDED SCIATICA: ICD-10-CM

## 2023-03-14 DIAGNOSIS — G89.29 NECK PAIN, CHRONIC: Primary | ICD-10-CM

## 2023-03-14 DIAGNOSIS — G89.29 CHRONIC BILATERAL LOW BACK PAIN WITH LEFT-SIDED SCIATICA: ICD-10-CM

## 2023-03-14 DIAGNOSIS — M54.2 NECK PAIN, CHRONIC: Primary | ICD-10-CM

## 2023-03-14 PROCEDURE — 99214 OFFICE O/P EST MOD 30 MIN: CPT | Performed by: NURSE PRACTITIONER

## 2023-03-14 RX ORDER — ONDANSETRON 4 MG/1
TABLET, FILM COATED ORAL
Qty: 20 TABLET | Refills: 0 | Status: SHIPPED | OUTPATIENT
Start: 2023-03-14

## 2023-03-14 RX ORDER — IBUPROFEN 800 MG/1
800 TABLET ORAL EVERY 8 HOURS PRN
Qty: 90 TABLET | Refills: 0 | Status: SHIPPED | OUTPATIENT
Start: 2023-03-14

## 2023-03-14 RX ORDER — ZOLPIDEM TARTRATE 10 MG/1
10 TABLET ORAL NIGHTLY PRN
Qty: 30 TABLET | Refills: 0 | Status: SHIPPED | OUTPATIENT
Start: 2023-03-14 | End: 2024-03-19

## 2023-03-14 SDOH — ECONOMIC STABILITY: FOOD INSECURITY: WITHIN THE PAST 12 MONTHS, THE FOOD YOU BOUGHT JUST DIDN'T LAST AND YOU DIDN'T HAVE MONEY TO GET MORE.: NEVER TRUE

## 2023-03-14 SDOH — ECONOMIC STABILITY: FOOD INSECURITY: WITHIN THE PAST 12 MONTHS, YOU WORRIED THAT YOUR FOOD WOULD RUN OUT BEFORE YOU GOT MONEY TO BUY MORE.: NEVER TRUE

## 2023-03-14 SDOH — ECONOMIC STABILITY: HOUSING INSECURITY
IN THE LAST 12 MONTHS, WAS THERE A TIME WHEN YOU DID NOT HAVE A STEADY PLACE TO SLEEP OR SLEPT IN A SHELTER (INCLUDING NOW)?: NO

## 2023-03-14 SDOH — ECONOMIC STABILITY: INCOME INSECURITY: HOW HARD IS IT FOR YOU TO PAY FOR THE VERY BASICS LIKE FOOD, HOUSING, MEDICAL CARE, AND HEATING?: NOT HARD AT ALL

## 2023-03-14 ASSESSMENT — PATIENT HEALTH QUESTIONNAIRE - PHQ9
4. FEELING TIRED OR HAVING LITTLE ENERGY: 2
SUM OF ALL RESPONSES TO PHQ9 QUESTIONS 1 & 2: 2
2. FEELING DOWN, DEPRESSED OR HOPELESS: 1
SUM OF ALL RESPONSES TO PHQ QUESTIONS 1-9: 10
SUM OF ALL RESPONSES TO PHQ QUESTIONS 1-9: 10
10. IF YOU CHECKED OFF ANY PROBLEMS, HOW DIFFICULT HAVE THESE PROBLEMS MADE IT FOR YOU TO DO YOUR WORK, TAKE CARE OF THINGS AT HOME, OR GET ALONG WITH OTHER PEOPLE: 1
1. LITTLE INTEREST OR PLEASURE IN DOING THINGS: 1
3. TROUBLE FALLING OR STAYING ASLEEP: 3
SUM OF ALL RESPONSES TO PHQ QUESTIONS 1-9: 10
7. TROUBLE CONCENTRATING ON THINGS, SUCH AS READING THE NEWSPAPER OR WATCHING TELEVISION: 1
5. POOR APPETITE OR OVEREATING: 1
9. THOUGHTS THAT YOU WOULD BE BETTER OFF DEAD, OR OF HURTING YOURSELF: 0
6. FEELING BAD ABOUT YOURSELF - OR THAT YOU ARE A FAILURE OR HAVE LET YOURSELF OR YOUR FAMILY DOWN: 0
SUM OF ALL RESPONSES TO PHQ QUESTIONS 1-9: 10
8. MOVING OR SPEAKING SO SLOWLY THAT OTHER PEOPLE COULD HAVE NOTICED. OR THE OPPOSITE, BEING SO FIGETY OR RESTLESS THAT YOU HAVE BEEN MOVING AROUND A LOT MORE THAN USUAL: 1

## 2023-03-14 ASSESSMENT — ENCOUNTER SYMPTOMS
SHORTNESS OF BREATH: 0
COUGH: 0
NAUSEA: 1
BACK PAIN: 1
VOMITING: 0
DIARRHEA: 0
CONSTIPATION: 1
WHEEZING: 0

## 2023-03-14 NOTE — PROGRESS NOTES
Ophelia Hardin (:  1980) is a 38 y.o. female,Established patient, here for evaluation of the following chief complaint(s):  Back Pain (Upper and lower back ), Neck Pain, and Wrist Pain (Right wrist pain for a couple weeks)         ASSESSMENT/PLAN:  1. Neck pain, chronic  -     ibuprofen (ADVIL;MOTRIN) 800 MG tablet; Take 1 tablet by mouth every 8 hours as needed for Pain, Disp-90 tablet, R-0Normal  -     External Referral To Physical Therapy  Contact office if symptoms do not improve as expected or worsen. 2. Chronic bilateral low back pain with left-sided sciatica  -     ibuprofen (ADVIL;MOTRIN) 800 MG tablet; Take 1 tablet by mouth every 8 hours as needed for Pain, Disp-90 tablet, R-0Normal  -     External Referral To Physical Therapy    3. Mid back pain on right side  -     ibuprofen (ADVIL;MOTRIN) 800 MG tablet; Take 1 tablet by mouth every 8 hours as needed for Pain, Disp-90 tablet, R-0Normal  -     External Referral To Physical Therapy    4. Primary insomnia  -     zolpidem (AMBIEN) 10 MG tablet; Take 1 tablet by mouth nightly as needed for Sleep., Disp-30 tablet, R-0Normal  Prn only  The current medical regimen is effective;  continue present plan and medications. 5. Nausea  -     ondansetron (ZOFRAN) 4 MG tablet; TAKE ONE TABLET BY MOUTH EVERY 8 HOURS AS NEEDED FOR NAUSEA OR VOMITING, Disp-20 tablet, R-0Normal  Prn only  The current medical regimen is effective;  continue present plan and medications. Controlled Substance Monitoring:    Acute and Chronic Pain Monitoring:   RX Monitoring 3/14/2023   Attestation -   Periodic Controlled Substance Monitoring No signs of potential drug abuse or diversion identified. Return if symptoms worsen or fail to improve. Subjective   SUBJECTIVE/OBJECTIVE:  Complains of pain to neck, upper and lower back. This is a chronic issue. Pain radiates down left leg at times.   Aggravated by standing long periods of time and sitting is certain positions. States some relief with ibuprofen, takes prn only. Complains of insomnia. Complains of difficulty falling and staying asleep. Taking ambien prn with good results  Complains of right wrist pain. Denies injury. Onset 2 weeks ago. 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 Respiratory:  Negative for cough, shortness of breath and wheezing. Cardiovascular:  Positive for chest pain (intermittent sharp pain). Negative for palpitations. Gastrointestinal:  Positive for constipation and nausea (relieved with zofran). Negative for diarrhea and vomiting. Musculoskeletal:  Positive for back pain, myalgias and neck pain. Neurological:  Positive for headaches. Negative for weakness and light-headedness. Psychiatric/Behavioral:  Positive for dysphoric mood (grieving loss of dad) and sleep disturbance. Negative for suicidal ideas. The patient is nervous/anxious. Objective   /64   Pulse 87   Temp 97 °F (36.1 °C)   Resp 14   Ht 5' (1.524 m)   Wt 187 lb 6.4 oz (85 kg)   SpO2 97%   BMI 36.60 kg/m²    Physical Exam  Constitutional:       General: She is not in acute distress. Appearance: Normal appearance. She is well-developed. HENT:      Head: Normocephalic and atraumatic. Neck:      Thyroid: No thyromegaly. Trachea: No tracheal deviation. Cardiovascular:      Rate and Rhythm: Normal rate and regular rhythm. Heart sounds: Normal heart sounds. No murmur heard. Pulmonary:      Effort: Pulmonary effort is normal. No respiratory distress. Breath sounds: Normal breath sounds. No wheezing, rhonchi or rales. Chest:      Chest wall: No tenderness. Abdominal:      General: Bowel sounds are normal.      Palpations: Abdomen is soft. Tenderness: There is no abdominal tenderness.    Musculoskeletal: General: Tenderness present. Comments: Tenderness to cervical, thoracic and lumbar spine and left SI.  ROM limited due to pain. Negative SLR bilateral. Upper and lower extremities equal and strong. Right lateral wrist tender. No obvious deformity. ROM limited due to pain   Lymphadenopathy:      Cervical: No cervical adenopathy. Skin:     General: Skin is warm and dry. Neurological:      Mental Status: She is alert and oriented to person, place, and time. Psychiatric:         Mood and Affect: Mood normal.         Behavior: Behavior normal.          [facility] moderate      An electronic signature was used to authenticate this note.     --DANIELE Park - CNP

## 2023-04-03 ENCOUNTER — PATIENT MESSAGE (OUTPATIENT)
Dept: FAMILY MEDICINE CLINIC | Age: 43
End: 2023-04-03

## 2023-04-03 DIAGNOSIS — M25.531 RIGHT WRIST PAIN: Primary | ICD-10-CM

## 2023-04-03 NOTE — TELEPHONE ENCOUNTER
From: Sandra Comment  To: Dori Saey  Sent: 4/3/2023 11:38 AM EDT  Subject: Wrist pain    I have been wearing the wrist brace as you told me but am still having the pain in the right wrist, right side. The brace is not helping. Can we do the imaging now?

## 2023-04-04 DIAGNOSIS — M25.531 RIGHT WRIST PAIN: Primary | ICD-10-CM

## 2023-04-16 SDOH — HEALTH STABILITY: PHYSICAL HEALTH: ON AVERAGE, HOW MANY DAYS PER WEEK DO YOU ENGAGE IN MODERATE TO STRENUOUS EXERCISE (LIKE A BRISK WALK)?: 0 DAYS

## 2023-04-16 SDOH — HEALTH STABILITY: PHYSICAL HEALTH: ON AVERAGE, HOW MANY MINUTES DO YOU ENGAGE IN EXERCISE AT THIS LEVEL?: PATIENT DECLINED

## 2023-04-18 ENCOUNTER — OFFICE VISIT (OUTPATIENT)
Dept: ORTHOPEDIC SURGERY | Age: 43
End: 2023-04-18
Payer: COMMERCIAL

## 2023-04-18 VITALS — HEIGHT: 60 IN | TEMPERATURE: 98 F | BODY MASS INDEX: 35.34 KG/M2 | WEIGHT: 180 LBS

## 2023-04-18 DIAGNOSIS — S69.81XA INJURY OF TRIANGULAR FIBROCARTILAGE COMPLEX (TFCC) OF RIGHT WRIST, INITIAL ENCOUNTER: Primary | ICD-10-CM

## 2023-04-18 DIAGNOSIS — S60.211A CONTUSION OF RIGHT WRIST, INITIAL ENCOUNTER: ICD-10-CM

## 2023-04-18 PROCEDURE — 99203 OFFICE O/P NEW LOW 30 MIN: CPT | Performed by: ORTHOPAEDIC SURGERY

## 2023-04-20 ENCOUNTER — TRANSCRIBE ORDERS (OUTPATIENT)
Dept: ADMINISTRATIVE | Age: 43
End: 2023-04-20

## 2023-04-20 DIAGNOSIS — Z12.31 ENCOUNTER FOR SCREENING MAMMOGRAM FOR MALIGNANT NEOPLASM OF BREAST: Primary | ICD-10-CM

## 2023-04-24 ENCOUNTER — TELEPHONE (OUTPATIENT)
Dept: ORTHOPEDIC SURGERY | Age: 43
End: 2023-04-24

## 2023-04-24 DIAGNOSIS — G89.29 NECK PAIN, CHRONIC: ICD-10-CM

## 2023-04-24 DIAGNOSIS — G89.29 CHRONIC BILATERAL LOW BACK PAIN WITH LEFT-SIDED SCIATICA: Primary | ICD-10-CM

## 2023-04-24 DIAGNOSIS — F40.240 CLAUSTROPHOBIA: Primary | ICD-10-CM

## 2023-04-24 DIAGNOSIS — M54.2 NECK PAIN, CHRONIC: ICD-10-CM

## 2023-04-24 DIAGNOSIS — M54.42 CHRONIC BILATERAL LOW BACK PAIN WITH LEFT-SIDED SCIATICA: Primary | ICD-10-CM

## 2023-04-24 RX ORDER — DIAZEPAM 5 MG/1
5 TABLET ORAL PRN
Qty: 1 TABLET | Refills: 0 | Status: SHIPPED | OUTPATIENT
Start: 2023-04-24 | End: 2023-04-25

## 2023-04-24 NOTE — TELEPHONE ENCOUNTER
Pt called in wanting to know if Dr. Robin Reese could send in an RX for anxiety for her MRI park on 4/28/23? Pharmacy is [facility] in [facility]. Please, advise. Tiffany Gu can be reached at [phone]. Thank you.

## 2023-04-24 NOTE — TELEPHONE ENCOUNTER
Patient's chart was reviewed including pertinent information including allergies, current medications, medical conditions, last exam and office notes, available musculoskeletal imaging, most recent labs and other physician and provider notes. In 6-minute chart review, the most appropriate medication/treatment was ordered. In addition, patient did not/ does not have any scheduled office visits within 7 days of this encounter. Patient was educated to monitor for adverse reactions and to notify office of any issues. Patient was informed of the potential for addiction of long term use of narcotic medication for pain control. Hero Castellanos was seen today for other.     Diagnoses and all orders for this visit:    Valencia Gorman

## 2023-05-09 ENCOUNTER — OFFICE VISIT (OUTPATIENT)
Dept: ORTHOPEDIC SURGERY | Age: 43
End: 2023-05-09

## 2023-05-09 VITALS — HEIGHT: 60 IN | WEIGHT: 180 LBS | TEMPERATURE: 98 F | BODY MASS INDEX: 35.34 KG/M2

## 2023-05-09 DIAGNOSIS — S69.81XA INJURY OF TRIANGULAR FIBROCARTILAGE COMPLEX (TFCC) OF RIGHT WRIST, INITIAL ENCOUNTER: Primary | ICD-10-CM

## 2023-05-09 DIAGNOSIS — S60.211A CONTUSION OF RIGHT WRIST, INITIAL ENCOUNTER: ICD-10-CM

## 2023-05-09 NOTE — PROGRESS NOTES
Chief Complaint   Patient presents with    Wrist Pain     Right Wrist MRI F/U         HPI:    Patient is 38 y.o. female complaining insidious onset of right wrist pain for several months. She admits to pain along the ulnar border. She denies numbness or tingling. Previous treatments include bracing, rest, ice, heat, NSAIDs, HEP without much relief. Follows up after MRI. ROS:    Skin: (-) rash,(-) psoriasis,(-) eczema, (-)skin cancer. Neurologic: (-)numbness, (-)tingling, (-)headaches, (-) LOC. Cardiovascular: (-) Chest pain, (-) swelling in legs/feet, (-) SOB, (-) cramping in legs/feet with walking.     All other review of systems negative except stated above or in HPI      Past Medical History:   Diagnosis Date    Anxiety     Kidney stone     New onset headache 9/11/2017     Past Surgical History:   Procedure Laterality Date    CYSTOSCOPY  12/12/2016    with right stent/ESWL    LITHOTRIPSY      TONSILLECTOMY         Current Outpatient Medications:     ibuprofen (ADVIL;MOTRIN) 800 MG tablet, Take 1 tablet by mouth every 8 hours as needed for Pain, Disp: 90 tablet, Rfl: 0    zolpidem (AMBIEN) 10 MG tablet, Take 1 tablet by mouth nightly as needed for Sleep., Disp: 30 tablet, Rfl: 0    ondansetron (ZOFRAN) 4 MG tablet, TAKE ONE TABLET BY MOUTH EVERY 8 HOURS AS NEEDED FOR NAUSEA OR VOMITING, Disp: 20 tablet, Rfl: 0    pantoprazole (PROTONIX) 40 MG tablet, TAKE ONE TABLET BY MOUTH EVERY DAY, Disp: 30 tablet, Rfl: 2  Allergies   Allergen Reactions    Codeine Rash     Social History     Socioeconomic History    Marital status:      Spouse name: Not on file    Number of children: Not on file    Years of education: Not on file    Highest education level: Not on file   Occupational History    Not on file   Tobacco Use    Smoking status: Never    Smokeless tobacco: Never   Substance and Sexual Activity    Alcohol use: No    Drug use: No    Sexual activity: Yes     Partners: Male   Other Topics Concern    Not on

## 2023-05-19 DIAGNOSIS — S69.81XA INJURY OF TRIANGULAR FIBROCARTILAGE COMPLEX (TFCC) OF RIGHT WRIST, INITIAL ENCOUNTER: Primary | ICD-10-CM

## 2023-05-26 ENCOUNTER — HOSPITAL ENCOUNTER (OUTPATIENT)
Dept: MAMMOGRAPHY | Age: 43
Discharge: HOME | End: 2023-05-26
Attending: OBSTETRICS & GYNECOLOGY
Payer: COMMERCIAL

## 2023-05-26 DIAGNOSIS — Z12.31 ENCOUNTER FOR SCREENING MAMMOGRAM FOR MALIGNANT NEOPLASM OF BREAST: ICD-10-CM

## 2023-05-26 PROCEDURE — 77063 BREAST TOMOSYNTHESIS BI: CPT

## 2023-07-19 DIAGNOSIS — R11.0 NAUSEA: ICD-10-CM

## 2023-07-19 DIAGNOSIS — M54.42 CHRONIC BILATERAL LOW BACK PAIN WITH LEFT-SIDED SCIATICA: ICD-10-CM

## 2023-07-19 DIAGNOSIS — M54.2 NECK PAIN, CHRONIC: ICD-10-CM

## 2023-07-19 DIAGNOSIS — G89.29 CHRONIC BILATERAL LOW BACK PAIN WITH LEFT-SIDED SCIATICA: ICD-10-CM

## 2023-07-19 DIAGNOSIS — G89.29 NECK PAIN, CHRONIC: ICD-10-CM

## 2023-07-19 DIAGNOSIS — M54.9 MID BACK PAIN ON RIGHT SIDE: ICD-10-CM

## 2023-07-19 DIAGNOSIS — F51.01 PRIMARY INSOMNIA: ICD-10-CM

## 2023-07-19 RX ORDER — ZOLPIDEM TARTRATE 10 MG/1
10 TABLET ORAL NIGHTLY PRN
Qty: 30 TABLET | Refills: 0 | Status: CANCELLED | OUTPATIENT
Start: 2023-07-19 | End: 2024-07-24

## 2023-07-20 ENCOUNTER — TELEPHONE (OUTPATIENT)
Dept: FAMILY MEDICINE CLINIC | Age: 43
End: 2023-07-20

## 2023-07-20 RX ORDER — IBUPROFEN 800 MG/1
800 TABLET ORAL EVERY 8 HOURS PRN
Qty: 90 TABLET | Refills: 0 | Status: SHIPPED | OUTPATIENT
Start: 2023-07-20

## 2023-07-20 RX ORDER — ONDANSETRON 4 MG/1
TABLET, FILM COATED ORAL
Qty: 20 TABLET | Refills: 0 | Status: SHIPPED | OUTPATIENT
Start: 2023-07-20

## 2023-07-20 NOTE — TELEPHONE ENCOUNTER
Attempted to call patient but cell number busy; received request for Ambien refill today but pt needs to be seen for refill

## 2023-07-20 NOTE — TELEPHONE ENCOUNTER
Requested Prescriptions     Pending Prescriptions Disp Refills    ibuprofen (ADVIL;MOTRIN) 800 MG tablet 90 tablet 0     Sig: Take 1 tablet by mouth every 8 hours as needed for Pain    zolpidem (AMBIEN) 10 MG tablet 30 tablet 0     Sig: Take 1 tablet by mouth nightly as needed for Sleep.    ondansetron (ZOFRAN) 4 MG tablet 20 tablet 0     Sig: TAKE ONE TABLET BY MOUTH EVERY 8 HOURS AS NEEDED FOR NAUSEA OR VOMITING       Next appt is Visit date not found  Last appt was 3/14/2023

## 2023-07-25 ENCOUNTER — TELEMEDICINE (OUTPATIENT)
Dept: FAMILY MEDICINE CLINIC | Age: 43
End: 2023-07-25
Payer: COMMERCIAL

## 2023-07-25 DIAGNOSIS — F51.01 PRIMARY INSOMNIA: ICD-10-CM

## 2023-07-25 PROCEDURE — 99213 OFFICE O/P EST LOW 20 MIN: CPT | Performed by: NURSE PRACTITIONER

## 2023-07-25 RX ORDER — ZOLPIDEM TARTRATE 10 MG/1
10 TABLET ORAL NIGHTLY PRN
Qty: 30 TABLET | Refills: 0 | Status: SHIPPED | OUTPATIENT
Start: 2023-07-25 | End: 2024-07-30

## 2023-07-25 ASSESSMENT — ENCOUNTER SYMPTOMS
SHORTNESS OF BREATH: 0
NAUSEA: 1
CONSTIPATION: 0
COUGH: 0
DIARRHEA: 0
VOMITING: 0
WHEEZING: 0

## 2023-07-25 NOTE — PROGRESS NOTES
Sav Man (:  1980) is a Established patient, presenting virtually for evaluation of the following:    Assessment & Plan   Below is the assessment and plan developed based on review of pertinent history, physical exam, labs, studies, and medications. 1. Primary insomnia  -     zolpidem (AMBIEN) 10 MG tablet; Take 1 tablet by mouth nightly as needed for Sleep., Disp-30 tablet, R-0Normal  The current medical regimen is effective;  continue present plan and medications. Prn only    Controlled Substance Monitoring:    Acute and Chronic Pain Monitoring:   RX Monitoring 2023   Attestation -   Periodic Controlled Substance Monitoring No signs of potential drug abuse or diversion identified. No follow-ups on file. Subjective   Complains of difficulty falling and staying asleep. Ambien is effective. Takes prn    Review of Systems   Constitutional:  Negative for activity change, appetite change, fatigue and unexpected weight change. Respiratory:  Negative for cough, shortness of breath and wheezing. Cardiovascular:  Negative for chest pain and palpitations. Gastrointestinal:  Positive for nausea. Negative for constipation, diarrhea and vomiting. Neurological:  Negative for weakness, light-headedness and headaches. Psychiatric/Behavioral:  Positive for sleep disturbance. Negative for dysphoric mood. The patient is nervous/anxious.          Objective   Patient-Reported Vitals  No data recorded     Physical Exam      Constitutional: [x] Appears well-developed and well-nourished [x] No apparent distress      [] Abnormal -     Mental status: [x] Alert and awake  [x] Oriented to person/place/time [x] Able to follow commands    [] Abnormal -     Eyes:   EOM    [x]  Normal    [] Abnormal -   Sclera  [x]  Normal    [] Abnormal -          Discharge [x]  None visible   [] Abnormal -     HENT: [x] Normocephalic, atraumatic  [] Abnormal -   [] Mouth/Throat: Mucous membranes are moist    Neck:

## 2023-09-14 ENCOUNTER — EVALUATION (OUTPATIENT)
Dept: OCCUPATIONAL THERAPY | Age: 43
End: 2023-09-14

## 2023-09-14 DIAGNOSIS — M25.539 PAIN IN THE WRIST, UNSPECIFIED LATERALITY: Primary | ICD-10-CM

## 2023-09-14 NOTE — PROGRESS NOTES
Date:      Please review Patient's OT evaluation and if you agree sign/date and fax back to us at our [facility] Fax: [phone].  Thank you for your referral!

## 2023-09-15 PROBLEM — M25.539: Status: ACTIVE | Noted: 2023-09-15

## 2023-09-20 ENCOUNTER — TREATMENT (OUTPATIENT)
Dept: OCCUPATIONAL THERAPY | Age: 43
End: 2023-09-20
Payer: COMMERCIAL

## 2023-09-20 DIAGNOSIS — M25.539 PAIN IN THE WRIST, UNSPECIFIED LATERALITY: Primary | ICD-10-CM

## 2023-09-20 PROCEDURE — 97140 MANUAL THERAPY 1/> REGIONS: CPT | Performed by: OCCUPATIONAL THERAPY ASSISTANT

## 2023-09-20 PROCEDURE — 97110 THERAPEUTIC EXERCISES: CPT | Performed by: OCCUPATIONAL THERAPY ASSISTANT

## 2023-09-20 PROCEDURE — 97530 THERAPEUTIC ACTIVITIES: CPT | Performed by: OCCUPATIONAL THERAPY ASSISTANT

## 2023-09-20 PROCEDURE — 97022 WHIRLPOOL THERAPY: CPT | Performed by: OCCUPATIONAL THERAPY ASSISTANT

## 2023-09-20 NOTE — PROGRESS NOTES
OCCUPATIONAL THERAPY DAILY NOTE  [address] [facility] THERAPY  [address] [facility] [address] [address]  Dept: [phone]  Loc: [phone]   Natalia Grove OT Fax: [phone]      Date:  2023  Initial Evaluation Date: 23   Evaluating Therapist: Franky Duran OT/L     Patient Name:  [facility]    :  1980    Restrictions/Precautions: Activity as tolerated, Low fall risk  Diagnosis:  M25.539 Pain in unspecified wrist (Left)                                                             Date of Surgery/Injury: Pain several months/ no surgery     Insurance/Certification information:  [address] [place] [address] signed (Y/N): N  Visit# / total visits: 2 / 4     Referring Practitioner:  Dr Karyle Bimler  Specific Practitioner Orders: OT for chronic left TFCC versus UCL of the wrist injury. OT evaluate and treat: Pt may start weaning out of brace with OT. For PROM, AROM, strengthening and widget digit brace application if needed. And other modalities as needed.      Assessment of current deficits   [] Functional mobility             [] ADLs           [] Strength                  [] Cognition   [] Functional transfers           [x] IADLs          [] Safety Awareness  [] Endurance   [] Fine Motor Coordination    [] Balance      [] Vision/perception    [x] Sensation     [] Gross Motor Coordination [] ROM           [x] Pain                        [] Edema          [] Scar Adhesion/Skin Integrity      OT PLAN OF CARE   OT POC based on physician orders, patient diagnosis and results of clinical assessment     Frequency/Duration: 1x/ wek x 4 visits     Specific OT Treatment to include:   [x] Instruction in HEP                   Modalities:  [x] Therapeutic Exercise                 [x] Ultrasound               [] Electrical Stimulation/Attended  [x] PROM/Stretching                    [x] Fluidotherapy          []  Paraffin                   [] AAROM  [x] AROM

## 2023-09-29 ENCOUNTER — TREATMENT (OUTPATIENT)
Dept: OCCUPATIONAL THERAPY | Age: 43
End: 2023-09-29

## 2023-09-29 DIAGNOSIS — M25.539 PAIN IN THE WRIST, UNSPECIFIED LATERALITY: Primary | ICD-10-CM

## 2023-10-05 ENCOUNTER — TREATMENT (OUTPATIENT)
Dept: OCCUPATIONAL THERAPY | Age: 43
End: 2023-10-05

## 2023-10-05 DIAGNOSIS — M25.539 PAIN IN THE WRIST, UNSPECIFIED LATERALITY: Primary | ICD-10-CM

## 2023-10-05 NOTE — PROGRESS NOTES
OCCUPATIONAL THERAPY PROGRESS NOTE/ DISCHARGE SUMMARY  [address]  [facility] THERAPY   [place] Rao [address] [address]  Dept: [phone]  Loc: [phone]   Regaan Mckinney OT Fax: [phone]      Date:  10/5/2023  Initial Evaluation Date: 23   Evaluating Therapist: Cassie Kapoor OT/L     Patient Name:  Caity Garcia    :  1980    Restrictions/Precautions: Activity as tolerated, Low fall risk  Diagnosis:  M25.539 Pain in unspecified wrist (Right)                                                            Date of Surgery/Injury: Pain several months/ no surgery     Insurance/Certification information:  [address] [place] [address] signed (Y/N): Y through 23  Visit# / total visits:      Referring Practitioner:  Dr Lanette Rebolledo  Specific Practitioner Orders: OT for chronic left TFCC versus UCL of the wrist injury. OT evaluate and treat: Pt may start weaning out of brace with OT. For PROM, AROM, strengthening and widget digit brace application if needed. And other modalities as needed.      Assessment of current deficits   [] Functional mobility             [] ADLs           [] Strength                  [] Cognition   [] Functional transfers           [x] IADLs          [] Safety Awareness  [] Endurance   [] Fine Motor Coordination    [] Balance      [] Vision/perception    [x] Sensation     [] Gross Motor Coordination [] ROM           [x] Pain                        [] Edema          [] Scar Adhesion/Skin Integrity      OT PLAN OF CARE   OT POC based on physician orders, patient diagnosis and results of clinical assessment     Frequency/Duration: 1x/ wek x 4 visits     Specific OT Treatment to include:   [x] Instruction in HEP                   Modalities:  [x] Therapeutic Exercise                 [x] Ultrasound               [] Electrical Stimulation/Attended  [x] PROM/Stretching                    [x] Fluidotherapy          []  Paraffin

## 2023-10-18 ENCOUNTER — OFFICE VISIT (OUTPATIENT)
Dept: FAMILY MEDICINE CLINIC | Age: 43
End: 2023-10-18
Payer: COMMERCIAL

## 2023-10-18 VITALS
DIASTOLIC BLOOD PRESSURE: 74 MMHG | RESPIRATION RATE: 14 BRPM | BODY MASS INDEX: 37.18 KG/M2 | HEIGHT: 60 IN | OXYGEN SATURATION: 97 % | HEART RATE: 75 BPM | TEMPERATURE: 97.1 F | SYSTOLIC BLOOD PRESSURE: 126 MMHG | WEIGHT: 189.4 LBS

## 2023-10-18 DIAGNOSIS — G89.29 CHRONIC BILATERAL LOW BACK PAIN WITH LEFT-SIDED SCIATICA: ICD-10-CM

## 2023-10-18 DIAGNOSIS — Z00.00 ENCOUNTER FOR WELL ADULT EXAM WITHOUT ABNORMAL FINDINGS: Primary | ICD-10-CM

## 2023-10-18 DIAGNOSIS — R11.0 NAUSEA: ICD-10-CM

## 2023-10-18 DIAGNOSIS — M54.2 NECK PAIN, CHRONIC: ICD-10-CM

## 2023-10-18 DIAGNOSIS — G89.29 NECK PAIN, CHRONIC: ICD-10-CM

## 2023-10-18 DIAGNOSIS — E55.9 VITAMIN D DEFICIENCY: ICD-10-CM

## 2023-10-18 DIAGNOSIS — E66.01 SEVERE OBESITY (BMI 35.0-39.9) WITH COMORBIDITY (HCC): ICD-10-CM

## 2023-10-18 DIAGNOSIS — Z00.00 ROUTINE GENERAL MEDICAL EXAMINATION AT HEALTH CARE FACILITY: ICD-10-CM

## 2023-10-18 DIAGNOSIS — E78.2 MIXED HYPERLIPIDEMIA: ICD-10-CM

## 2023-10-18 DIAGNOSIS — M54.9 MID BACK PAIN ON RIGHT SIDE: ICD-10-CM

## 2023-10-18 DIAGNOSIS — K21.9 GASTROESOPHAGEAL REFLUX DISEASE, UNSPECIFIED WHETHER ESOPHAGITIS PRESENT: ICD-10-CM

## 2023-10-18 DIAGNOSIS — F51.01 PRIMARY INSOMNIA: ICD-10-CM

## 2023-10-18 DIAGNOSIS — M54.42 CHRONIC BILATERAL LOW BACK PAIN WITH LEFT-SIDED SCIATICA: ICD-10-CM

## 2023-10-18 LAB — HBA1C MFR BLD: 5.7 %

## 2023-10-18 PROCEDURE — 99396 PREV VISIT EST AGE 40-64: CPT | Performed by: FAMILY MEDICINE

## 2023-10-18 PROCEDURE — 83036 HEMOGLOBIN GLYCOSYLATED A1C: CPT | Performed by: FAMILY MEDICINE

## 2023-10-18 RX ORDER — PANTOPRAZOLE SODIUM 40 MG/1
TABLET, DELAYED RELEASE ORAL
Qty: 30 TABLET | Refills: 2 | Status: SHIPPED | OUTPATIENT
Start: 2023-10-18

## 2023-10-18 RX ORDER — ONDANSETRON 4 MG/1
TABLET, FILM COATED ORAL
Qty: 20 TABLET | Refills: 0 | Status: SHIPPED | OUTPATIENT
Start: 2023-10-18

## 2023-10-18 RX ORDER — IBUPROFEN 800 MG/1
800 TABLET ORAL EVERY 8 HOURS PRN
Qty: 90 TABLET | Refills: 0 | Status: SHIPPED | OUTPATIENT
Start: 2023-10-18

## 2023-10-18 RX ORDER — ZOLPIDEM TARTRATE 10 MG/1
10 TABLET ORAL NIGHTLY PRN
Qty: 30 TABLET | Refills: 0 | Status: SHIPPED | OUTPATIENT
Start: 2023-10-18 | End: 2024-10-23

## 2023-10-18 ASSESSMENT — PATIENT HEALTH QUESTIONNAIRE - PHQ9
SUM OF ALL RESPONSES TO PHQ QUESTIONS 1-9: 10
6. FEELING BAD ABOUT YOURSELF - OR THAT YOU ARE A FAILURE OR HAVE LET YOURSELF OR YOUR FAMILY DOWN: 0
7. TROUBLE CONCENTRATING ON THINGS, SUCH AS READING THE NEWSPAPER OR WATCHING TELEVISION: 1
8. MOVING OR SPEAKING SO SLOWLY THAT OTHER PEOPLE COULD HAVE NOTICED. OR THE OPPOSITE, BEING SO FIGETY OR RESTLESS THAT YOU HAVE BEEN MOVING AROUND A LOT MORE THAN USUAL: 0
SUM OF ALL RESPONSES TO PHQ9 QUESTIONS 1 & 2: 2
9. THOUGHTS THAT YOU WOULD BE BETTER OFF DEAD, OR OF HURTING YOURSELF: 0
5. POOR APPETITE OR OVEREATING: 1
1. LITTLE INTEREST OR PLEASURE IN DOING THINGS: 1
3. TROUBLE FALLING OR STAYING ASLEEP: 3
4. FEELING TIRED OR HAVING LITTLE ENERGY: 3
2. FEELING DOWN, DEPRESSED OR HOPELESS: 1
SUM OF ALL RESPONSES TO PHQ QUESTIONS 1-9: 10
SUM OF ALL RESPONSES TO PHQ QUESTIONS 1-9: 10
10. IF YOU CHECKED OFF ANY PROBLEMS, HOW DIFFICULT HAVE THESE PROBLEMS MADE IT FOR YOU TO DO YOUR WORK, TAKE CARE OF THINGS AT HOME, OR GET ALONG WITH OTHER PEOPLE: 1
SUM OF ALL RESPONSES TO PHQ QUESTIONS 1-9: 10

## 2023-10-18 ASSESSMENT — COLUMBIA-SUICIDE SEVERITY RATING SCALE - C-SSRS
7. DID THIS OCCUR IN THE LAST THREE MONTHS: NO
5. HAVE YOU STARTED TO WORK OUT OR WORKED OUT THE DETAILS OF HOW TO KILL YOURSELF? DO YOU INTEND TO CARRY OUT THIS PLAN?: NO
3. HAVE YOU BEEN THINKING ABOUT HOW YOU MIGHT KILL YOURSELF?: NO
4. HAVE YOU HAD THESE THOUGHTS AND HAD SOME INTENTION OF ACTING ON THEM?: NO

## 2023-10-18 ASSESSMENT — ENCOUNTER SYMPTOMS
SHORTNESS OF BREATH: 0
ABDOMINAL PAIN: 0
WHEEZING: 0
CONSTIPATION: 0
VOMITING: 0
DIARRHEA: 0
NAUSEA: 0
BLOOD IN STOOL: 0
COUGH: 0

## 2023-10-18 NOTE — PROGRESS NOTES
Charli Erazo (:  1980) is a 41 y.o. female,Established patient, here for evaluation of the following chief complaint(s): Annual Exam (Positive depression screen)         ASSESSMENT/PLAN:  1. Mixed hyperlipidemia  -     CBC with Auto Differential; Future  -     Comprehensive Metabolic Panel; Future  -     Lipid Panel; Future  2. Chronic bilateral low back pain with left-sided sciatica  -     ibuprofen (ADVIL;MOTRIN) 800 MG tablet; Take 1 tablet by mouth every 8 hours as needed for Pain, Disp-90 tablet, R-0Normal  3. Mid back pain on right side  -     ibuprofen (ADVIL;MOTRIN) 800 MG tablet; Take 1 tablet by mouth every 8 hours as needed for Pain, Disp-90 tablet, R-0Normal  4. Neck pain, chronic  -     ibuprofen (ADVIL;MOTRIN) 800 MG tablet; Take 1 tablet by mouth every 8 hours as needed for Pain, Disp-90 tablet, R-0Normal  5. Nausea  -     ondansetron (ZOFRAN) 4 MG tablet; TAKE ONE TABLET BY MOUTH EVERY 8 HOURS AS NEEDED FOR NAUSEA OR VOMITING, Disp-20 tablet, R-0Normal  6. Gastroesophageal reflux disease, unspecified whether esophagitis present  -     pantoprazole (PROTONIX) 40 MG tablet; TAKE ONE TABLET BY MOUTH EVERY DAY, Disp-30 tablet, R-2Normal  7. Primary insomnia  -     zolpidem (AMBIEN) 10 MG tablet; Take 1 tablet by mouth nightly as needed for Sleep., Disp-30 tablet, R-0Normal  8. Routine general medical examination at health care facility  -     TSH; Future  9. Severe obesity (BMI 35.0-39. 9) with comorbidity ([address])  10. Vitamin D deficiency  -     Vitamin D 25 Hydroxy; Future      No follow-ups on file. Subjective   SUBJECTIVE/OBJECTIVE:  HPI    Review of Systems   Constitutional:  Negative for chills, diaphoresis and fever. HENT:  Negative for ear discharge, ear pain, hearing loss, nosebleeds and tinnitus. Respiratory:  Negative for cough, shortness of breath and wheezing. Cardiovascular:  Negative for chest pain.    Gastrointestinal:  Negative for abdominal pain, blood in stool,

## 2023-10-18 NOTE — PROGRESS NOTES
Well Adult Note  Name: Kayce Eagle Date: 10/18/2023   MRN: [id number] Sex: Female   Age: 41 y.o. Ethnicity: Non- / Non    : 1980 Race: White (non-)      Rodney Elena is here for well adult exam.  History:  Neg Hx     Review of Systems   Constitutional:  Negative for chills, diaphoresis and fever. HENT:  Negative for ear discharge, ear pain, hearing loss, nosebleeds and tinnitus. Respiratory:  Negative for cough, shortness of breath and wheezing. Cardiovascular:  Negative for chest pain. Gastrointestinal:  Negative for abdominal pain, blood in stool, constipation, diarrhea, nausea and vomiting. Genitourinary:  Negative for dysuria, flank pain and hematuria. Musculoskeletal:  Negative for myalgias. Skin:  Negative for rash. Neurological:  Negative for headaches. Hematological:  Does not bruise/bleed easily. Psychiatric/Behavioral:  Negative for hallucinations and suicidal ideas. Allergies   Allergen Reactions    Codeine Rash         Prior to Visit Medications    Medication Sig Taking? Authorizing Provider   ibuprofen (ADVIL;MOTRIN) 800 MG tablet Take 1 tablet by mouth every 8 hours as needed for Pain Yes Victoria Terrazas DO   ondansetron (ZOFRAN) 4 MG tablet TAKE ONE TABLET BY MOUTH EVERY 8 HOURS AS NEEDED FOR NAUSEA OR VOMITING Yes Victoria Terrazas DO   pantoprazole (PROTONIX) 40 MG tablet TAKE ONE TABLET BY MOUTH EVERY DAY Yes Victoria Terrazas DO   zolpidem (AMBIEN) 10 MG tablet Take 1 tablet by mouth nightly as needed for Sleep.  Yes Victoria Terrazas DO         Past Medical History:   Diagnosis Date    Anxiety     Kidney stone     New onset headache 2017       Past Surgical History:   Procedure Laterality Date    CYSTOSCOPY  2016    with right stent/ESWL    LITHOTRIPSY      TONSILLECTOMY           Family History   Problem Relation Age of Onset    Breast Cancer Maternal Grandmother     Prostate Cancer Maternal

## 2023-10-21 ENCOUNTER — HOSPITAL ENCOUNTER (OUTPATIENT)
Age: 43
Discharge: HOME | End: 2023-10-21
Payer: COMMERCIAL

## 2023-10-21 DIAGNOSIS — E55.9 VITAMIN D DEFICIENCY: ICD-10-CM

## 2023-10-21 DIAGNOSIS — Z00.00 ROUTINE GENERAL MEDICAL EXAMINATION AT HEALTH CARE FACILITY: ICD-10-CM

## 2023-10-21 DIAGNOSIS — E78.2 MIXED HYPERLIPIDEMIA: ICD-10-CM

## 2023-10-21 LAB
25(OH)D3 SERPL-MCNC: 28.7 NG/ML (ref 30–100)
ALBUMIN SERPL-MCNC: 4.3 G/DL (ref 3.5–5.2)
ALP SERPL-CCNC: 100 U/L (ref 35–104)
ALT SERPL-CCNC: 28 U/L (ref 0–32)
ANION GAP SERPL CALCULATED.3IONS-SCNC: 5 MMOL/L (ref 7–16)
AST SERPL-CCNC: 25 U/L (ref 0–31)
BASOPHILS # BLD: 0.05 K/UL (ref 0–0.2)
BASOPHILS NFR BLD: 1 % (ref 0–2)
BILIRUB SERPL-MCNC: 0.4 MG/DL (ref 0–1.2)
BUN SERPL-MCNC: 10 MG/DL (ref 6–20)
CALCIUM SERPL-MCNC: 9.6 MG/DL (ref 8.6–10.2)
CHLORIDE SERPL-SCNC: 103 MMOL/L (ref 98–107)
CHOLEST SERPL-MCNC: 223 MG/DL
CO2 SERPL-SCNC: 30 MMOL/L (ref 22–29)
CREAT SERPL-MCNC: 0.9 MG/DL (ref 0.5–1)
EOSINOPHIL # BLD: 0.27 K/UL (ref 0.05–0.5)
EOSINOPHILS RELATIVE PERCENT: 4 % (ref 0–6)
ERYTHROCYTE [DISTWIDTH] IN BLOOD BY AUTOMATED COUNT: 12.7 % (ref 11.5–15)
GFR SERPL CREATININE-BSD FRML MDRD: >60 ML/MIN/1.73M2
GLUCOSE SERPL-MCNC: 105 MG/DL (ref 74–99)
HCT VFR BLD AUTO: 42.4 % (ref 34–48)
HDLC SERPL-MCNC: 41 MG/DL
HGB BLD-MCNC: 14.2 G/DL (ref 11.5–15.5)
IMM GRANULOCYTES # BLD AUTO: <0.03 K/UL (ref 0–0.58)
IMM GRANULOCYTES NFR BLD: 0 % (ref 0–5)
LDLC SERPL CALC-MCNC: 154 MG/DL
LYMPHOCYTES NFR BLD: 2.37 K/UL (ref 1.5–4)
LYMPHOCYTES RELATIVE PERCENT: 32 % (ref 20–42)
MCH RBC QN AUTO: 31.1 PG (ref 26–35)
MCHC RBC AUTO-ENTMCNC: 33.5 G/DL (ref 32–34.5)
MCV RBC AUTO: 93 FL (ref 80–99.9)
MONOCYTES NFR BLD: 0.5 K/UL (ref 0.1–0.95)
MONOCYTES NFR BLD: 7 % (ref 2–12)
NEUTROPHILS NFR BLD: 56 % (ref 43–80)
NEUTS SEG NFR BLD: 4.17 K/UL (ref 1.8–7.3)
PLATELET # BLD AUTO: 326 K/UL (ref 130–450)
PMV BLD AUTO: 10.1 FL (ref 7–12)
POTASSIUM SERPL-SCNC: 4.8 MMOL/L (ref 3.5–5)
PROT SERPL-MCNC: 6.9 G/DL (ref 6.4–8.3)
RBC # BLD AUTO: 4.56 M/UL (ref 3.5–5.5)
SODIUM SERPL-SCNC: 138 MMOL/L (ref 132–146)
TRIGL SERPL-MCNC: 140 MG/DL
TSH SERPL DL<=0.05 MIU/L-ACNC: 2.42 UIU/ML (ref 0.27–4.2)
VLDLC SERPL CALC-MCNC: 28 MG/DL
WBC OTHER # BLD: 7.4 K/UL (ref 4.5–11.5)

## 2023-10-21 PROCEDURE — 85025 COMPLETE CBC W/AUTO DIFF WBC: CPT

## 2023-10-21 PROCEDURE — 80061 LIPID PANEL: CPT

## 2023-10-21 PROCEDURE — 36415 COLL VENOUS BLD VENIPUNCTURE: CPT

## 2023-10-21 PROCEDURE — 84443 ASSAY THYROID STIM HORMONE: CPT

## 2023-10-21 PROCEDURE — 82306 VITAMIN D 25 HYDROXY: CPT

## 2023-10-21 PROCEDURE — 80053 COMPREHEN METABOLIC PANEL: CPT

## 2023-11-01 ENCOUNTER — OFFICE VISIT (OUTPATIENT)
Dept: FAMILY MEDICINE CLINIC | Age: 43
End: 2023-11-01
Payer: COMMERCIAL

## 2023-11-01 VITALS
BODY MASS INDEX: 37.46 KG/M2 | RESPIRATION RATE: 16 BRPM | HEIGHT: 60 IN | TEMPERATURE: 97.7 F | WEIGHT: 190.8 LBS | DIASTOLIC BLOOD PRESSURE: 70 MMHG | SYSTOLIC BLOOD PRESSURE: 100 MMHG | HEART RATE: 102 BPM | OXYGEN SATURATION: 98 %

## 2023-11-01 DIAGNOSIS — R35.0 URINARY FREQUENCY: Primary | ICD-10-CM

## 2023-11-01 DIAGNOSIS — R10.2 PELVIC PAIN IN FEMALE: ICD-10-CM

## 2023-11-01 LAB
BILIRUBIN, POC: NEGATIVE
BLOOD URINE, POC: ABNORMAL
CLARITY, POC: CLEAR
COLOR, POC: YELLOW
GLUCOSE URINE, POC: NEGATIVE
KETONES, POC: NEGATIVE
LEUKOCYTE EST, POC: NEGATIVE
NITRITE, POC: NEGATIVE
PH, POC: 5.5
PROTEIN, POC: NEGATIVE
SPECIFIC GRAVITY, POC: <=1.005
UROBILINOGEN, POC: 0.2

## 2023-11-01 PROCEDURE — 99213 OFFICE O/P EST LOW 20 MIN: CPT | Performed by: NURSE PRACTITIONER

## 2023-11-01 PROCEDURE — 81002 URINALYSIS NONAUTO W/O SCOPE: CPT | Performed by: NURSE PRACTITIONER

## 2023-11-01 ASSESSMENT — PATIENT HEALTH QUESTIONNAIRE - PHQ9
SUM OF ALL RESPONSES TO PHQ QUESTIONS 1-9: 0
SUM OF ALL RESPONSES TO PHQ QUESTIONS 1-9: 0
9. THOUGHTS THAT YOU WOULD BE BETTER OFF DEAD, OR OF HURTING YOURSELF: 0
SUM OF ALL RESPONSES TO PHQ QUESTIONS 1-9: 0
SUM OF ALL RESPONSES TO PHQ QUESTIONS 1-9: 0
2. FEELING DOWN, DEPRESSED OR HOPELESS: 0
6. FEELING BAD ABOUT YOURSELF - OR THAT YOU ARE A FAILURE OR HAVE LET YOURSELF OR YOUR FAMILY DOWN: 0
SUM OF ALL RESPONSES TO PHQ9 QUESTIONS 1 & 2: 0
1. LITTLE INTEREST OR PLEASURE IN DOING THINGS: 0
SUM OF ALL RESPONSES TO PHQ QUESTIONS 1-9: 0
7. TROUBLE CONCENTRATING ON THINGS, SUCH AS READING THE NEWSPAPER OR WATCHING TELEVISION: 0
5. POOR APPETITE OR OVEREATING: 0
10. IF YOU CHECKED OFF ANY PROBLEMS, HOW DIFFICULT HAVE THESE PROBLEMS MADE IT FOR YOU TO DO YOUR WORK, TAKE CARE OF THINGS AT HOME, OR GET ALONG WITH OTHER PEOPLE: 0
1. LITTLE INTEREST OR PLEASURE IN DOING THINGS: 0
3. TROUBLE FALLING OR STAYING ASLEEP: 0
SUM OF ALL RESPONSES TO PHQ9 QUESTIONS 1 & 2: 0
SUM OF ALL RESPONSES TO PHQ QUESTIONS 1-9: 0
4. FEELING TIRED OR HAVING LITTLE ENERGY: 0
SUM OF ALL RESPONSES TO PHQ QUESTIONS 1-9: 0
2. FEELING DOWN, DEPRESSED OR HOPELESS: 0
SUM OF ALL RESPONSES TO PHQ QUESTIONS 1-9: 0
8. MOVING OR SPEAKING SO SLOWLY THAT OTHER PEOPLE COULD HAVE NOTICED. OR THE OPPOSITE, BEING SO FIGETY OR RESTLESS THAT YOU HAVE BEEN MOVING AROUND A LOT MORE THAN USUAL: 0

## 2023-11-01 ASSESSMENT — COLUMBIA-SUICIDE SEVERITY RATING SCALE - C-SSRS
5. HAVE YOU STARTED TO WORK OUT OR WORKED OUT THE DETAILS OF HOW TO KILL YOURSELF? DO YOU INTEND TO CARRY OUT THIS PLAN?: NO
3. HAVE YOU BEEN THINKING ABOUT HOW YOU MIGHT KILL YOURSELF?: NO
7. DID THIS OCCUR IN THE LAST THREE MONTHS: NO
4. HAVE YOU HAD THESE THOUGHTS AND HAD SOME INTENTION OF ACTING ON THEM?: NO

## 2023-11-01 ASSESSMENT — ENCOUNTER SYMPTOMS
WHEEZING: 0
VOMITING: 0
SHORTNESS OF BREATH: 0
COUGH: 0
DIARRHEA: 0
NAUSEA: 0
CONSTIPATION: 0

## 2023-11-07 DIAGNOSIS — R35.0 URINARY FREQUENCY: Primary | ICD-10-CM

## 2023-11-19 ENCOUNTER — HOSPITAL ENCOUNTER (EMERGENCY)
Age: 43
Discharge: HOME | End: 2023-11-19
Payer: COMMERCIAL

## 2023-11-19 VITALS
BODY MASS INDEX: 36.13 KG/M2 | RESPIRATION RATE: 16 BRPM | SYSTOLIC BLOOD PRESSURE: 128 MMHG | DIASTOLIC BLOOD PRESSURE: 83 MMHG | HEART RATE: 96 BPM | OXYGEN SATURATION: 99 % | TEMPERATURE: 98.2 F | WEIGHT: 185 LBS

## 2023-11-19 DIAGNOSIS — N30.00 ACUTE CYSTITIS WITHOUT HEMATURIA: Primary | ICD-10-CM

## 2023-11-19 LAB
BACTERIA URNS QL MICRO: ABNORMAL
BILIRUB UR QL STRIP: ABNORMAL
CLARITY UR: CLEAR
COLOR UR: ABNORMAL
EPI CELLS #/AREA URNS HPF: ABNORMAL /HPF
GLUCOSE UR STRIP-MCNC: 100 MG/DL
HCG UR QL: NEGATIVE
HGB UR QL STRIP.AUTO: NEGATIVE
KETONES UR STRIP-MCNC: ABNORMAL MG/DL
LEUKOCYTE ESTERASE UR QL STRIP: ABNORMAL
NITRITE UR QL STRIP: POSITIVE
PH UR STRIP: 6 [PH] (ref 5–9)
PROT UR STRIP-MCNC: 30 MG/DL
RBC #/AREA URNS HPF: ABNORMAL /HPF
SP GR UR STRIP: 1.01 (ref 1–1.03)
UROBILINOGEN UR STRIP-ACNC: 2 EU/DL (ref 0–1)
WBC #/AREA URNS HPF: ABNORMAL /HPF

## 2023-11-19 PROCEDURE — 84703 CHORIONIC GONADOTROPIN ASSAY: CPT

## 2023-11-19 PROCEDURE — 99211 OFF/OP EST MAY X REQ PHY/QHP: CPT

## 2023-11-19 PROCEDURE — 81001 URINALYSIS AUTO W/SCOPE: CPT

## 2023-11-19 PROCEDURE — 87086 URINE CULTURE/COLONY COUNT: CPT

## 2023-11-19 RX ORDER — CEFDINIR 300 MG/1
300 CAPSULE ORAL 2 TIMES DAILY
Qty: 14 CAPSULE | Refills: 0 | Status: SHIPPED | OUTPATIENT
Start: 2023-11-19 | End: 2023-11-26

## 2023-11-19 RX ORDER — PHENAZOPYRIDINE HYDROCHLORIDE 200 MG/1
200 TABLET, FILM COATED ORAL 3 TIMES DAILY PRN
Qty: 9 TABLET | Refills: 0 | Status: SHIPPED | OUTPATIENT
Start: 2023-11-19 | End: 2023-11-22

## 2023-11-19 ASSESSMENT — PAIN - FUNCTIONAL ASSESSMENT: PAIN_FUNCTIONAL_ASSESSMENT: NONE - DENIES PAIN

## 2023-11-19 NOTE — DISCHARGE INSTRUCTIONS
Your urine has some bacteria and positive for nitrites. You will start Omnicef twice daily for 7 days, Pyridium 3 times daily as needed for pain. Please be aware and the Pyridium most for symptom relief only. Please follow-up with your PCP next week for reevaluation if your symptoms do not improve.

## 2023-11-19 NOTE — ED PROVIDER NOTES
[place] [facility]  Department of Emergency Medicine    encounter Note  Admit Date/RoomTime: 2023  4:28 PM   room:     NAME: Ashlyn Abel  : 1980  MRN: [id number]     Chief Complaint:  Urinary Tract Infection (Frequency, pressure, not feeling empty after going, started about 2 weeks ago, saw PCP)    History of Present Illness       Ashlyn Abel is a 41 y.o. old female who presents to the urgent care with complaints of a 2-week history of urinary frequency, pressure, sensation of incomplete bladder emptying. Symptoms started a few weeks ago, was seen at her PCP and had recently taken Bactrim, her original urinalysis was negative at that time. Patient states that symptoms have been persistently worsening. She denies any dysuria, nausea, vomiting, fevers, back or flank pain. Took some Azo Standard earlier with moderate relief of symptoms. ROS   Pertinent positives and negatives are stated within HPI, all other systems reviewed and are negative. Past Medical History:  has a past medical history of Anxiety, Kidney stone, and New onset headache. Surgical History:  has a past surgical history that includes Tonsillectomy; Cystoscopy (2016); and Lithotripsy. Social History:  reports that she has never smoked. She has never used smokeless tobacco. She reports that she does not drink alcohol and does not use drugs. Family History: family history includes Breast Cancer in her maternal grandmother; Pancreatic Cancer in her maternal uncle; Prostate Cancer in her maternal grandfather. Allergies: Codeine    Physical Exam   Oxygen Saturation Interpretation: Normal.        ED Triage Vitals   BP Temp Temp src Pulse Resp SpO2 Height Weight   -- -- -- -- -- -- -- --         Constitutional:  Alert, development consistent with age. HEENT:  NC/NT. Airway patent. Neck:  Normal ROM. Supple.   Respiratory:  Lungs Clear to auscultation and breath sounds equal.  CV:  Regular rate and

## 2023-11-22 LAB
MICROORGANISM SPEC CULT: NO GROWTH
SPECIMEN DESCRIPTION: NORMAL

## 2023-12-05 ENCOUNTER — PATIENT MESSAGE (OUTPATIENT)
Dept: FAMILY MEDICINE CLINIC | Age: 43
End: 2023-12-05

## 2023-12-05 DIAGNOSIS — R35.0 URINARY FREQUENCY: Primary | ICD-10-CM

## 2023-12-05 DIAGNOSIS — R31.9 HEMATURIA, UNSPECIFIED TYPE: ICD-10-CM

## 2023-12-06 NOTE — TELEPHONE ENCOUNTER
From: Tomeka Garcia  To: Femi Leon  Sent: 12/5/2023 6:27 PM EST  Subject: UTI    I came in for possible UTI, had blood but no infection, about a week later ended up in urgent care with UTI and antibiotic. Now was at [address] [facility] Sunday still with blood and no infection again. Can I get a referral to a urologist in [address] instead of [address]  Still having pain in bladder, pressure, frequency and urgency

## 2023-12-10 ENCOUNTER — APPOINTMENT (OUTPATIENT)
Dept: CT IMAGING | Age: 43
End: 2023-12-10
Payer: COMMERCIAL

## 2023-12-10 ENCOUNTER — HOSPITAL ENCOUNTER (EMERGENCY)
Age: 43
Discharge: HOME | End: 2023-12-10
Attending: EMERGENCY MEDICINE
Payer: COMMERCIAL

## 2023-12-10 VITALS
RESPIRATION RATE: 21 BRPM | HEART RATE: 96 BPM | DIASTOLIC BLOOD PRESSURE: 81 MMHG | SYSTOLIC BLOOD PRESSURE: 125 MMHG | WEIGHT: 182.98 LBS | BODY MASS INDEX: 35.74 KG/M2 | TEMPERATURE: 98 F | OXYGEN SATURATION: 100 %

## 2023-12-10 DIAGNOSIS — N20.1 URETERIC STONE: Primary | ICD-10-CM

## 2023-12-10 LAB
ALBUMIN SERPL-MCNC: 4.3 G/DL (ref 3.5–5.2)
ALP SERPL-CCNC: 96 U/L (ref 35–104)
ALT SERPL-CCNC: 27 U/L (ref 0–32)
ANION GAP SERPL CALCULATED.3IONS-SCNC: 8 MMOL/L (ref 7–16)
AST SERPL-CCNC: 23 U/L (ref 0–31)
BASOPHILS # BLD: 0.05 K/UL (ref 0–0.2)
BASOPHILS NFR BLD: 1 % (ref 0–2)
BILIRUB SERPL-MCNC: 0.2 MG/DL (ref 0–1.2)
BILIRUB UR QL STRIP: NEGATIVE
BUN SERPL-MCNC: 14 MG/DL (ref 6–20)
CALCIUM SERPL-MCNC: 9.1 MG/DL (ref 8.6–10.2)
CHLORIDE SERPL-SCNC: 103 MMOL/L (ref 98–107)
CLARITY UR: CLEAR
CO2 SERPL-SCNC: 27 MMOL/L (ref 22–29)
COLOR UR: YELLOW
CREAT SERPL-MCNC: 0.9 MG/DL (ref 0.5–1)
EOSINOPHIL # BLD: 0.33 K/UL (ref 0.05–0.5)
EOSINOPHILS RELATIVE PERCENT: 4 % (ref 0–6)
ERYTHROCYTE [DISTWIDTH] IN BLOOD BY AUTOMATED COUNT: 12.8 % (ref 11.5–15)
GFR SERPL CREATININE-BSD FRML MDRD: >60 ML/MIN/1.73M2
GLUCOSE SERPL-MCNC: 111 MG/DL (ref 74–99)
GLUCOSE UR STRIP-MCNC: NEGATIVE MG/DL
HCG, URINE, POC: NEGATIVE
HCT VFR BLD AUTO: 41.9 % (ref 34–48)
HGB BLD-MCNC: 14 G/DL (ref 11.5–15.5)
HGB UR QL STRIP.AUTO: ABNORMAL
IMM GRANULOCYTES # BLD AUTO: <0.03 K/UL (ref 0–0.58)
IMM GRANULOCYTES NFR BLD: 0 % (ref 0–5)
KETONES UR STRIP-MCNC: NEGATIVE MG/DL
LEUKOCYTE ESTERASE UR QL STRIP: NEGATIVE
LYMPHOCYTES NFR BLD: 2.26 K/UL (ref 1.5–4)
LYMPHOCYTES RELATIVE PERCENT: 30 % (ref 20–42)
Lab: NORMAL
MCH RBC QN AUTO: 31.1 PG (ref 26–35)
MCHC RBC AUTO-ENTMCNC: 33.4 G/DL (ref 32–34.5)
MCV RBC AUTO: 93.1 FL (ref 80–99.9)
MONOCYTES NFR BLD: 0.48 K/UL (ref 0.1–0.95)
MONOCYTES NFR BLD: 6 % (ref 2–12)
NEGATIVE QC PASS/FAIL: NORMAL
NEUTROPHILS NFR BLD: 58 % (ref 43–80)
NEUTS SEG NFR BLD: 4.4 K/UL (ref 1.8–7.3)
NITRITE UR QL STRIP: NEGATIVE
PH UR STRIP: 7 [PH] (ref 5–9)
PLATELET # BLD AUTO: 328 K/UL (ref 130–450)
PMV BLD AUTO: 10.4 FL (ref 7–12)
POSITIVE QC PASS/FAIL: NORMAL
POTASSIUM SERPL-SCNC: 4.4 MMOL/L (ref 3.5–5)
PROT SERPL-MCNC: 6.8 G/DL (ref 6.4–8.3)
PROT UR STRIP-MCNC: NEGATIVE MG/DL
RBC # BLD AUTO: 4.5 M/UL (ref 3.5–5.5)
RBC #/AREA URNS HPF: ABNORMAL /HPF
SODIUM SERPL-SCNC: 138 MMOL/L (ref 132–146)
SP GR UR STRIP: 1.01 (ref 1–1.03)
UROBILINOGEN UR STRIP-ACNC: 0.2 EU/DL (ref 0–1)
WBC #/AREA URNS HPF: ABNORMAL /HPF
WBC OTHER # BLD: 7.5 K/UL (ref 4.5–11.5)

## 2023-12-10 PROCEDURE — 6360000004 HC RX CONTRAST MEDICATION: Performed by: RADIOLOGY

## 2023-12-10 PROCEDURE — 85025 COMPLETE CBC W/AUTO DIFF WBC: CPT

## 2023-12-10 PROCEDURE — 96375 TX/PRO/DX INJ NEW DRUG ADDON: CPT

## 2023-12-10 PROCEDURE — 74177 CT ABD & PELVIS W/CONTRAST: CPT

## 2023-12-10 PROCEDURE — 96374 THER/PROPH/DIAG INJ IV PUSH: CPT

## 2023-12-10 PROCEDURE — 99285 EMERGENCY DEPT VISIT HI MDM: CPT

## 2023-12-10 PROCEDURE — 6360000002 HC RX W HCPCS: Performed by: EMERGENCY MEDICINE

## 2023-12-10 PROCEDURE — 81001 URINALYSIS AUTO W/SCOPE: CPT

## 2023-12-10 PROCEDURE — 2580000003 HC RX 258: Performed by: EMERGENCY MEDICINE

## 2023-12-10 PROCEDURE — 80053 COMPREHEN METABOLIC PANEL: CPT

## 2023-12-10 PROCEDURE — 87086 URINE CULTURE/COLONY COUNT: CPT

## 2023-12-10 RX ORDER — ONDANSETRON 2 MG/ML
4 INJECTION INTRAMUSCULAR; INTRAVENOUS ONCE
Status: COMPLETED | OUTPATIENT
Start: 2023-12-10 | End: 2023-12-10

## 2023-12-10 RX ORDER — 0.9 % SODIUM CHLORIDE 0.9 %
1000 INTRAVENOUS SOLUTION INTRAVENOUS ONCE
Status: COMPLETED | OUTPATIENT
Start: 2023-12-10 | End: 2023-12-10

## 2023-12-10 RX ORDER — FENTANYL CITRATE 0.05 MG/ML
50 INJECTION, SOLUTION INTRAMUSCULAR; INTRAVENOUS ONCE
Status: COMPLETED | OUTPATIENT
Start: 2023-12-10 | End: 2023-12-10

## 2023-12-10 RX ADMIN — SODIUM CHLORIDE 1000 ML: 9 INJECTION, SOLUTION INTRAVENOUS at 09:49

## 2023-12-10 RX ADMIN — IOPAMIDOL 75 ML: 755 INJECTION, SOLUTION INTRAVENOUS at 10:38

## 2023-12-10 RX ADMIN — FENTANYL CITRATE 50 MCG: 0.05 INJECTION, SOLUTION INTRAMUSCULAR; INTRAVENOUS at 09:49

## 2023-12-10 RX ADMIN — ONDANSETRON 4 MG: 2 INJECTION INTRAMUSCULAR; INTRAVENOUS at 09:49

## 2023-12-10 ASSESSMENT — ENCOUNTER SYMPTOMS
CHEST TIGHTNESS: 0
ABDOMINAL PAIN: 1
SHORTNESS OF BREATH: 0

## 2023-12-10 ASSESSMENT — PAIN SCALES - GENERAL: PAINLEVEL_OUTOF10: 7

## 2023-12-11 LAB
MICROORGANISM SPEC CULT: NO GROWTH
SPECIMEN DESCRIPTION: NORMAL

## 2023-12-15 ENCOUNTER — HOSPITAL ENCOUNTER (OUTPATIENT)
Age: 43
Discharge: HOME | End: 2023-12-15
Payer: COMMERCIAL

## 2023-12-15 ENCOUNTER — HOSPITAL ENCOUNTER (OUTPATIENT)
Dept: GENERAL RADIOLOGY | Age: 43
End: 2023-12-15
Payer: COMMERCIAL

## 2023-12-15 DIAGNOSIS — N20.0 CALCULUS OF KIDNEY: ICD-10-CM

## 2023-12-15 PROCEDURE — 74018 RADEX ABDOMEN 1 VIEW: CPT

## 2024-01-17 DIAGNOSIS — R11.0 NAUSEA: ICD-10-CM

## 2024-01-17 DIAGNOSIS — F51.01 PRIMARY INSOMNIA: ICD-10-CM

## 2024-01-18 RX ORDER — ONDANSETRON 4 MG/1
TABLET, FILM COATED ORAL
Qty: 20 TABLET | Refills: 0 | Status: SHIPPED | OUTPATIENT
Start: 2024-01-18

## 2024-01-18 RX ORDER — ZOLPIDEM TARTRATE 10 MG/1
10 TABLET ORAL NIGHTLY PRN
Qty: 30 TABLET | Refills: 0 | Status: SHIPPED | OUTPATIENT
Start: 2024-01-18 | End: 2025-01-23

## 2024-01-18 NOTE — TELEPHONE ENCOUNTER
Requested Prescriptions     Pending Prescriptions Disp Refills    ondansetron (ZOFRAN) 4 MG tablet 20 tablet 0     Sig: TAKE ONE TABLET BY MOUTH EVERY 8 HOURS AS NEEDED FOR NAUSEA OR VOMITING    zolpidem (AMBIEN) 10 MG tablet 30 tablet 0     Sig: Take 1 tablet by mouth nightly as needed for Sleep.       Next appt is Visit date not found  Last appt was 11/1/2023

## 2024-02-22 ENCOUNTER — TELEPHONE (OUTPATIENT)
Dept: FAMILY MEDICINE CLINIC | Age: 44
End: 2024-02-22

## 2024-02-22 DIAGNOSIS — M13.80 MIGRATORY POLYARTHRITIS: ICD-10-CM

## 2024-02-22 DIAGNOSIS — M79.7 FIBROMYALGIA: Primary | ICD-10-CM

## 2024-02-28 ENCOUNTER — OFFICE VISIT (OUTPATIENT)
Dept: FAMILY MEDICINE CLINIC | Age: 44
End: 2024-02-28
Payer: COMMERCIAL

## 2024-02-28 VITALS
SYSTOLIC BLOOD PRESSURE: 100 MMHG | HEIGHT: 60 IN | OXYGEN SATURATION: 94 % | DIASTOLIC BLOOD PRESSURE: 60 MMHG | TEMPERATURE: 98.7 F | BODY MASS INDEX: 35.74 KG/M2 | HEART RATE: 124 BPM

## 2024-02-28 DIAGNOSIS — J10.1 INFLUENZA A: Primary | ICD-10-CM

## 2024-02-28 LAB
INFLUENZA A ANTIGEN, POC: POSITIVE
INFLUENZA B ANTIGEN, POC: NEGATIVE
LOT EXPIRE DATE: ABNORMAL
LOT KIT NUMBER: ABNORMAL
SARS-COV-2, POC: ABNORMAL
VALID INTERNAL CONTROL: ABNORMAL
VENDOR AND KIT NAME POC: ABNORMAL

## 2024-02-28 PROCEDURE — 99214 OFFICE O/P EST MOD 30 MIN: CPT | Performed by: NURSE PRACTITIONER

## 2024-02-28 PROCEDURE — 87428 SARSCOV & INF VIR A&B AG IA: CPT | Performed by: NURSE PRACTITIONER

## 2024-02-28 RX ORDER — DEXTROMETHORPHAN HYDROBROMIDE AND PROMETHAZINE HYDROCHLORIDE 15; 6.25 MG/5ML; MG/5ML
5 SYRUP ORAL 4 TIMES DAILY PRN
Qty: 120 ML | Refills: 0 | Status: SHIPPED | OUTPATIENT
Start: 2024-02-28 | End: 2024-03-09

## 2024-02-28 RX ORDER — OSELTAMIVIR PHOSPHATE 75 MG/1
75 CAPSULE ORAL 2 TIMES DAILY
Qty: 10 CAPSULE | Refills: 0 | Status: SHIPPED | OUTPATIENT
Start: 2024-02-28 | End: 2024-03-04

## 2024-02-28 ASSESSMENT — PATIENT HEALTH QUESTIONNAIRE - PHQ9
2. FEELING DOWN, DEPRESSED OR HOPELESS: 1
4. FEELING TIRED OR HAVING LITTLE ENERGY: 3
SUM OF ALL RESPONSES TO PHQ QUESTIONS 1-9: 0
SUM OF ALL RESPONSES TO PHQ QUESTIONS 1-9: 11
SUM OF ALL RESPONSES TO PHQ9 QUESTIONS 1 & 2: 0
SUM OF ALL RESPONSES TO PHQ QUESTIONS 1-9: 0
1. LITTLE INTEREST OR PLEASURE IN DOING THINGS: 1
SUM OF ALL RESPONSES TO PHQ QUESTIONS 1-9: 11
SUM OF ALL RESPONSES TO PHQ QUESTIONS 1-9: 11
2. FEELING DOWN, DEPRESSED OR HOPELESS: 0
SUM OF ALL RESPONSES TO PHQ QUESTIONS 1-9: 0
7. TROUBLE CONCENTRATING ON THINGS, SUCH AS READING THE NEWSPAPER OR WATCHING TELEVISION: 3
8. MOVING OR SPEAKING SO SLOWLY THAT OTHER PEOPLE COULD HAVE NOTICED. OR THE OPPOSITE, BEING SO FIGETY OR RESTLESS THAT YOU HAVE BEEN MOVING AROUND A LOT MORE THAN USUAL: 0
9. THOUGHTS THAT YOU WOULD BE BETTER OFF DEAD, OR OF HURTING YOURSELF: 0
6. FEELING BAD ABOUT YOURSELF - OR THAT YOU ARE A FAILURE OR HAVE LET YOURSELF OR YOUR FAMILY DOWN: 0
SUM OF ALL RESPONSES TO PHQ9 QUESTIONS 1 & 2: 2
3. TROUBLE FALLING OR STAYING ASLEEP: 3
SUM OF ALL RESPONSES TO PHQ QUESTIONS 1-9: 0
5. POOR APPETITE OR OVEREATING: 0
10. IF YOU CHECKED OFF ANY PROBLEMS, HOW DIFFICULT HAVE THESE PROBLEMS MADE IT FOR YOU TO DO YOUR WORK, TAKE CARE OF THINGS AT HOME, OR GET ALONG WITH OTHER PEOPLE: 1
1. LITTLE INTEREST OR PLEASURE IN DOING THINGS: 0

## 2024-02-28 ASSESSMENT — ENCOUNTER SYMPTOMS
NAUSEA: 0
SINUS PRESSURE: 0
SORE THROAT: 1
SHORTNESS OF BREATH: 1
COUGH: 1
SINUS PAIN: 0
WHEEZING: 0
VOMITING: 0
DIARRHEA: 0
CONSTIPATION: 0

## 2024-02-28 ASSESSMENT — COLUMBIA-SUICIDE SEVERITY RATING SCALE - C-SSRS
1. WITHIN THE PAST MONTH, HAVE YOU WISHED YOU WERE DEAD OR WISHED YOU COULD GO TO SLEEP AND NOT WAKE UP?: NO
6. HAVE YOU EVER DONE ANYTHING, STARTED TO DO ANYTHING, OR PREPARED TO DO ANYTHING TO END YOUR LIFE?: NO

## 2024-02-28 NOTE — PROGRESS NOTES
Lashawn Sherwood (:  1980) is a 43 y.o. female,Established patient, here for evaluation of the following chief complaint(s):  Cough (Headache, body aches, ear pain, burning eyes, drainage: x 1 day)         ASSESSMENT/PLAN:  1. Influenza A  -     POCT COVID-19 & Influenza A/B  -     oseltamivir (TAMIFLU) 75 MG capsule; Take 1 capsule by mouth 2 times daily for 5 days, Disp-10 capsule, R-0Print  -     promethazine-dextromethorphan (PROMETHAZINE-DM) 6.25-15 MG/5ML syrup; Take 5 mLs by mouth 4 times daily as needed for Cough, Disp-120 mL, R-0Print  Contact office if symptoms do not improve as expected or worsen.  Hydrate well, monitor pulse.  ER with worsening    No follow-ups on file.         Subjective   SUBJECTIVE/OBJECTIVE:  Complains of cough, body aches, headache, ears hurt,burning sensation to eyes. Onset yesterday and getting worse. DUY has the flu and was at her house 3 days ago.         Review of Systems   Constitutional:  Positive for chills and diaphoresis. Negative for fever.   HENT:  Positive for congestion, ear pain and sore throat. Negative for sinus pressure and sinus pain.    Respiratory:  Positive for cough and shortness of breath. Negative for wheezing.    Cardiovascular:  Negative for chest pain and palpitations.   Gastrointestinal:  Negative for constipation, diarrhea, nausea and vomiting.   Musculoskeletal:  Positive for myalgias.   Skin:  Negative for rash.   Neurological:  Positive for dizziness and headaches. Negative for weakness.          Objective   /60   Pulse (!) 124   Temp 98.7 °F (37.1 °C)   Ht 1.524 m (5')   LMP 2024 (Exact Date)   SpO2 94%   BMI 35.74 kg/m²    Physical Exam  Constitutional:       General: She is not in acute distress.     Appearance: Normal appearance. She is well-developed. She is ill-appearing.   HENT:      Head: Normocephalic and atraumatic.      Right Ear: Ear canal and external ear normal. There is no impacted cerumen.      Left Ear: Ear

## 2024-03-08 ENCOUNTER — OFFICE VISIT (OUTPATIENT)
Dept: FAMILY MEDICINE CLINIC | Age: 44
End: 2024-03-08
Payer: COMMERCIAL

## 2024-03-08 VITALS
HEART RATE: 71 BPM | BODY MASS INDEX: 35.74 KG/M2 | OXYGEN SATURATION: 98 % | TEMPERATURE: 97.6 F | HEIGHT: 60 IN | DIASTOLIC BLOOD PRESSURE: 82 MMHG | RESPIRATION RATE: 16 BRPM | SYSTOLIC BLOOD PRESSURE: 118 MMHG

## 2024-03-08 DIAGNOSIS — J10.1 INFLUENZA A: ICD-10-CM

## 2024-03-08 DIAGNOSIS — R05.1 ACUTE COUGH: Primary | ICD-10-CM

## 2024-03-08 PROCEDURE — 99214 OFFICE O/P EST MOD 30 MIN: CPT | Performed by: NURSE PRACTITIONER

## 2024-03-08 RX ORDER — ALBUTEROL SULFATE 90 UG/1
2 AEROSOL, METERED RESPIRATORY (INHALATION) EVERY 6 HOURS PRN
Qty: 18 G | Refills: 0 | Status: SHIPPED | OUTPATIENT
Start: 2024-03-08

## 2024-03-08 RX ORDER — DEXTROMETHORPHAN HYDROBROMIDE AND PROMETHAZINE HYDROCHLORIDE 15; 6.25 MG/5ML; MG/5ML
5 SYRUP ORAL 4 TIMES DAILY PRN
Qty: 120 ML | Refills: 0 | Status: SHIPPED | OUTPATIENT
Start: 2024-03-08 | End: 2024-03-18

## 2024-03-08 RX ORDER — METHYLPREDNISOLONE 4 MG/1
TABLET ORAL
Qty: 1 KIT | Refills: 0 | Status: SHIPPED | OUTPATIENT
Start: 2024-03-08

## 2024-03-08 ASSESSMENT — PATIENT HEALTH QUESTIONNAIRE - PHQ9
3. TROUBLE FALLING OR STAYING ASLEEP: 0
8. MOVING OR SPEAKING SO SLOWLY THAT OTHER PEOPLE COULD HAVE NOTICED. OR THE OPPOSITE, BEING SO FIGETY OR RESTLESS THAT YOU HAVE BEEN MOVING AROUND A LOT MORE THAN USUAL: 0
SUM OF ALL RESPONSES TO PHQ QUESTIONS 1-9: 0
10. IF YOU CHECKED OFF ANY PROBLEMS, HOW DIFFICULT HAVE THESE PROBLEMS MADE IT FOR YOU TO DO YOUR WORK, TAKE CARE OF THINGS AT HOME, OR GET ALONG WITH OTHER PEOPLE: 0
6. FEELING BAD ABOUT YOURSELF - OR THAT YOU ARE A FAILURE OR HAVE LET YOURSELF OR YOUR FAMILY DOWN: 0
9. THOUGHTS THAT YOU WOULD BE BETTER OFF DEAD, OR OF HURTING YOURSELF: 0
5. POOR APPETITE OR OVEREATING: 0
SUM OF ALL RESPONSES TO PHQ QUESTIONS 1-9: 0
7. TROUBLE CONCENTRATING ON THINGS, SUCH AS READING THE NEWSPAPER OR WATCHING TELEVISION: 0
4. FEELING TIRED OR HAVING LITTLE ENERGY: 0
SUM OF ALL RESPONSES TO PHQ QUESTIONS 1-9: 0
2. FEELING DOWN, DEPRESSED OR HOPELESS: 0
1. LITTLE INTEREST OR PLEASURE IN DOING THINGS: 0
1. LITTLE INTEREST OR PLEASURE IN DOING THINGS: 0
2. FEELING DOWN, DEPRESSED OR HOPELESS: 0
SUM OF ALL RESPONSES TO PHQ QUESTIONS 1-9: 0
SUM OF ALL RESPONSES TO PHQ9 QUESTIONS 1 & 2: 0
SUM OF ALL RESPONSES TO PHQ QUESTIONS 1-9: 0
SUM OF ALL RESPONSES TO PHQ9 QUESTIONS 1 & 2: 0

## 2024-03-08 ASSESSMENT — ENCOUNTER SYMPTOMS
SHORTNESS OF BREATH: 1
COUGH: 1
VOMITING: 0
SINUS PRESSURE: 1
SORE THROAT: 0
WHEEZING: 0
DIARRHEA: 0
CONSTIPATION: 0
NAUSEA: 0

## 2024-03-08 NOTE — PROGRESS NOTES
Lashawn Sherwood (:  1980) is a 43 y.o. female,Established patient, here for evaluation of the following chief complaint(s):  Cough (Dx w/flu A last week: still coughing, fatigue, headache, ear fulness )         ASSESSMENT/PLAN:  1. Acute cough  -     albuterol sulfate HFA (PROVENTIL;VENTOLIN;PROAIR) 108 (90 Base) MCG/ACT inhaler; Inhale 2 puffs into the lungs every 6 hours as needed for Wheezing, Disp-18 g, R-0Normal  -     promethazine-dextromethorphan (PROMETHAZINE-DM) 6.25-15 MG/5ML syrup; Take 5 mLs by mouth 4 times daily as needed for Cough, Disp-120 mL, R-0Normal  -     XR CHEST (2 VW); Future  -     methylPREDNISolone (MEDROL, AMOR,) 4 MG tablet; Take by mouth as directed, Disp-1 kit, R-0Normal  2. Influenza A  -     albuterol sulfate HFA (PROVENTIL;VENTOLIN;PROAIR) 108 (90 Base) MCG/ACT inhaler; Inhale 2 puffs into the lungs every 6 hours as needed for Wheezing, Disp-18 g, R-0Normal  -     promethazine-dextromethorphan (PROMETHAZINE-DM) 6.25-15 MG/5ML syrup; Take 5 mLs by mouth 4 times daily as needed for Cough, Disp-120 mL, R-0Normal  -     XR CHEST (2 VW); Future  -     methylPREDNISolone (MEDROL, AMOR,) 4 MG tablet; Take by mouth as directed, Disp-1 kit, R-0Normal      No follow-ups on file.         Subjective   SUBJECTIVE/OBJECTIVE:  Complains of cough, fatigue, headaches, ear fullness b/l.  Diagnosed with flu A last week. Completed tamiflu and is taking promethazine DM        Review of Systems   Constitutional:  Positive for diaphoresis and fatigue. Negative for chills and fever.   HENT:  Positive for congestion and sinus pressure. Negative for ear pain (fullness) and sore throat.    Respiratory:  Positive for cough and shortness of breath. Negative for wheezing.    Cardiovascular:  Negative for chest pain and palpitations.   Gastrointestinal:  Negative for constipation, diarrhea, nausea and vomiting.   Musculoskeletal:  Positive for myalgias.   Skin:  Negative for rash.   Neurological:  Positive

## 2024-05-21 ENCOUNTER — OFFICE VISIT (OUTPATIENT)
Dept: FAMILY MEDICINE CLINIC | Age: 44
End: 2024-05-21
Payer: COMMERCIAL

## 2024-05-21 VITALS
BODY MASS INDEX: 38.21 KG/M2 | TEMPERATURE: 97.6 F | SYSTOLIC BLOOD PRESSURE: 100 MMHG | RESPIRATION RATE: 16 BRPM | WEIGHT: 194.6 LBS | HEIGHT: 60 IN | DIASTOLIC BLOOD PRESSURE: 62 MMHG | HEART RATE: 87 BPM | OXYGEN SATURATION: 98 %

## 2024-05-21 DIAGNOSIS — M54.2 NECK PAIN, CHRONIC: ICD-10-CM

## 2024-05-21 DIAGNOSIS — R11.0 NAUSEA: ICD-10-CM

## 2024-05-21 DIAGNOSIS — G89.29 NECK PAIN, CHRONIC: ICD-10-CM

## 2024-05-21 DIAGNOSIS — M76.32 ILIOTIBIAL BAND SYNDROME OF LEFT SIDE: ICD-10-CM

## 2024-05-21 DIAGNOSIS — F51.01 PRIMARY INSOMNIA: ICD-10-CM

## 2024-05-21 DIAGNOSIS — M25.562 ACUTE PAIN OF LEFT KNEE: Primary | ICD-10-CM

## 2024-05-21 DIAGNOSIS — G89.29 CHRONIC BILATERAL LOW BACK PAIN WITH LEFT-SIDED SCIATICA: ICD-10-CM

## 2024-05-21 DIAGNOSIS — K21.9 GASTROESOPHAGEAL REFLUX DISEASE, UNSPECIFIED WHETHER ESOPHAGITIS PRESENT: ICD-10-CM

## 2024-05-21 DIAGNOSIS — M54.42 CHRONIC BILATERAL LOW BACK PAIN WITH LEFT-SIDED SCIATICA: ICD-10-CM

## 2024-05-21 PROCEDURE — 99214 OFFICE O/P EST MOD 30 MIN: CPT | Performed by: NURSE PRACTITIONER

## 2024-05-21 RX ORDER — IBUPROFEN 800 MG/1
800 TABLET ORAL EVERY 8 HOURS PRN
Qty: 90 TABLET | Refills: 0 | Status: SHIPPED | OUTPATIENT
Start: 2024-05-21

## 2024-05-21 RX ORDER — ZOLPIDEM TARTRATE 10 MG/1
10 TABLET ORAL NIGHTLY PRN
Qty: 30 TABLET | Refills: 0 | Status: SHIPPED | OUTPATIENT
Start: 2024-05-21 | End: 2025-05-27

## 2024-05-21 RX ORDER — PANTOPRAZOLE SODIUM 40 MG/1
TABLET, DELAYED RELEASE ORAL
Qty: 30 TABLET | Refills: 2 | Status: SHIPPED | OUTPATIENT
Start: 2024-05-21

## 2024-05-21 RX ORDER — ONDANSETRON 4 MG/1
TABLET, FILM COATED ORAL
Qty: 20 TABLET | Refills: 0 | Status: SHIPPED | OUTPATIENT
Start: 2024-05-21

## 2024-05-21 SDOH — ECONOMIC STABILITY: FOOD INSECURITY: WITHIN THE PAST 12 MONTHS, THE FOOD YOU BOUGHT JUST DIDN'T LAST AND YOU DIDN'T HAVE MONEY TO GET MORE.: NEVER TRUE

## 2024-05-21 SDOH — ECONOMIC STABILITY: FOOD INSECURITY: WITHIN THE PAST 12 MONTHS, YOU WORRIED THAT YOUR FOOD WOULD RUN OUT BEFORE YOU GOT MONEY TO BUY MORE.: NEVER TRUE

## 2024-05-21 SDOH — ECONOMIC STABILITY: INCOME INSECURITY: HOW HARD IS IT FOR YOU TO PAY FOR THE VERY BASICS LIKE FOOD, HOUSING, MEDICAL CARE, AND HEATING?: NOT HARD AT ALL

## 2024-05-21 ASSESSMENT — PATIENT HEALTH QUESTIONNAIRE - PHQ9
SUM OF ALL RESPONSES TO PHQ QUESTIONS 1-9: 0
SUM OF ALL RESPONSES TO PHQ QUESTIONS 1-9: 0
2. FEELING DOWN, DEPRESSED OR HOPELESS: NOT AT ALL
SUM OF ALL RESPONSES TO PHQ QUESTIONS 1-9: 0
1. LITTLE INTEREST OR PLEASURE IN DOING THINGS: NOT AT ALL
SUM OF ALL RESPONSES TO PHQ9 QUESTIONS 1 & 2: 0
SUM OF ALL RESPONSES TO PHQ QUESTIONS 1-9: 0

## 2024-05-21 ASSESSMENT — ENCOUNTER SYMPTOMS
WHEEZING: 0
VOMITING: 0
SHORTNESS OF BREATH: 1
NAUSEA: 1
COUGH: 0
DIARRHEA: 0
CONSTIPATION: 0

## 2024-05-21 NOTE — PROGRESS NOTES
Lashawn Sherwood (:  1980) is a 43 y.o. female,Established patient, here for evaluation of the following chief complaint(s):  Knee Pain (Left knee) and Shoulder Pain (R shoulder pain, painful when lifting arm)      Assessment & Plan   ASSESSMENT/PLAN:  1. Acute pain of left knee  -     XR KNEE LEFT (3 VIEWS); Future  Knee sleeve  Contact office if symptoms do not improve as expected or worsen.    2. Chronic bilateral low back pain with left-sided sciatica  -     ibuprofen (ADVIL;MOTRIN) 800 MG tablet; Take 1 tablet by mouth every 8 hours as needed for Pain, Disp-90 tablet, R-0Normal  Prn  The current medical regimen is effective;  continue present plan and medications.    3. Neck pain, chronic  -     ibuprofen (ADVIL;MOTRIN) 800 MG tablet; Take 1 tablet by mouth every 8 hours as needed for Pain, Disp-90 tablet, R-0Normal  The current medical regimen is effective;  continue present plan and medications.    4. Nausea  -     ondansetron (ZOFRAN) 4 MG tablet; TAKE ONE TABLET BY MOUTH EVERY 8 HOURS AS NEEDED FOR NAUSEA OR VOMITING, Disp-20 tablet, R-0Normal  Prn only    5. Gastroesophageal reflux disease, unspecified whether esophagitis present  -     pantoprazole (PROTONIX) 40 MG tablet; TAKE ONE TABLET BY MOUTH EVERY DAY, Disp-30 tablet, R-2Normal  The current medical regimen is effective;  continue present plan and medications.    6. Primary insomnia  -     zolpidem (AMBIEN) 10 MG tablet; Take 1 tablet by mouth nightly as needed for Sleep., Disp-30 tablet, R-0Normal  Prn only  The current medical regimen is effective;  continue present plan and medications.    7. Iliotibial band syndrome of left side  Stretches provided    Controlled Substance Monitoring:    Acute and Chronic Pain Monitoring:   RX Monitoring Periodic Controlled Substance Monitoring   2024   9:05 AM No signs of potential drug abuse or diversion identified.           No follow-ups on file.         Subjective

## 2024-07-05 ENCOUNTER — HOSPITAL ENCOUNTER (OUTPATIENT)
Dept: MAMMOGRAPHY | Age: 44
Discharge: HOME | End: 2024-07-05
Payer: COMMERCIAL

## 2024-07-05 VITALS — WEIGHT: 185 LBS | BODY MASS INDEX: 36.13 KG/M2

## 2024-07-05 DIAGNOSIS — Z12.31 SCREENING MAMMOGRAM FOR BREAST CANCER: ICD-10-CM

## 2024-07-05 PROCEDURE — 77063 BREAST TOMOSYNTHESIS BI: CPT

## 2024-08-16 DIAGNOSIS — M54.2 NECK PAIN, CHRONIC: ICD-10-CM

## 2024-08-16 DIAGNOSIS — G89.29 NECK PAIN, CHRONIC: ICD-10-CM

## 2024-08-16 DIAGNOSIS — F51.01 PRIMARY INSOMNIA: ICD-10-CM

## 2024-08-16 DIAGNOSIS — K21.9 GASTROESOPHAGEAL REFLUX DISEASE, UNSPECIFIED WHETHER ESOPHAGITIS PRESENT: ICD-10-CM

## 2024-08-16 DIAGNOSIS — M54.42 CHRONIC BILATERAL LOW BACK PAIN WITH LEFT-SIDED SCIATICA: ICD-10-CM

## 2024-08-16 DIAGNOSIS — R11.0 NAUSEA: ICD-10-CM

## 2024-08-16 DIAGNOSIS — G89.29 CHRONIC BILATERAL LOW BACK PAIN WITH LEFT-SIDED SCIATICA: ICD-10-CM

## 2024-08-16 RX ORDER — ZOLPIDEM TARTRATE 10 MG/1
10 TABLET ORAL NIGHTLY PRN
Qty: 30 TABLET | Refills: 0 | Status: SHIPPED | OUTPATIENT
Start: 2024-08-16 | End: 2025-08-22

## 2024-08-16 RX ORDER — IBUPROFEN 800 MG/1
800 TABLET ORAL EVERY 8 HOURS PRN
Qty: 90 TABLET | Refills: 0 | Status: SHIPPED | OUTPATIENT
Start: 2024-08-16

## 2024-08-16 RX ORDER — PANTOPRAZOLE SODIUM 40 MG/1
TABLET, DELAYED RELEASE ORAL
Qty: 30 TABLET | Refills: 2 | Status: SHIPPED | OUTPATIENT
Start: 2024-08-16

## 2024-08-16 RX ORDER — ONDANSETRON 4 MG/1
TABLET, FILM COATED ORAL
Qty: 20 TABLET | Refills: 0 | Status: SHIPPED | OUTPATIENT
Start: 2024-08-16

## 2024-08-29 ENCOUNTER — OFFICE VISIT (OUTPATIENT)
Dept: RHEUMATOLOGY | Age: 44
End: 2024-08-29
Payer: COMMERCIAL

## 2024-08-29 VITALS — HEIGHT: 60 IN | BODY MASS INDEX: 35.34 KG/M2 | WEIGHT: 180 LBS

## 2024-08-29 DIAGNOSIS — M35.3 POLYMYALGIA (HCC): ICD-10-CM

## 2024-08-29 DIAGNOSIS — M13.0 POLYARTHRITIS: Primary | ICD-10-CM

## 2024-08-29 DIAGNOSIS — M13.0 POLYARTHRITIS: ICD-10-CM

## 2024-08-29 LAB
ALBUMIN: 4.2 G/DL (ref 3.5–5.2)
ALP BLD-CCNC: 101 U/L (ref 35–104)
ALT SERPL-CCNC: 43 U/L (ref 0–32)
ANION GAP SERPL CALCULATED.3IONS-SCNC: 17 MMOL/L (ref 7–16)
AST SERPL-CCNC: 32 U/L (ref 0–31)
BASOPHILS ABSOLUTE: 0.06 K/UL (ref 0–0.2)
BASOPHILS RELATIVE PERCENT: 1 % (ref 0–2)
BILIRUB SERPL-MCNC: 0.2 MG/DL (ref 0–1.2)
BUN BLDV-MCNC: 8 MG/DL (ref 6–20)
C-REACTIVE PROTEIN: 5 MG/L (ref 0–5)
CALCIUM SERPL-MCNC: 9.6 MG/DL (ref 8.6–10.2)
CHLORIDE BLD-SCNC: 102 MMOL/L (ref 98–107)
CO2: 21 MMOL/L (ref 22–29)
COMPLEMENT C3: 162 MG/DL (ref 90–180)
COMPLEMENT C4: 30 MG/DL (ref 10–40)
CREAT SERPL-MCNC: 0.8 MG/DL (ref 0.5–1)
EOSINOPHILS ABSOLUTE: 0.2 K/UL (ref 0.05–0.5)
EOSINOPHILS RELATIVE PERCENT: 2 % (ref 0–6)
GFR, ESTIMATED: 89 ML/MIN/1.73M2
GLUCOSE BLD-MCNC: 88 MG/DL (ref 74–99)
HCT VFR BLD CALC: 42.4 % (ref 34–48)
HEMOGLOBIN: 13.9 G/DL (ref 11.5–15.5)
IMMATURE GRANULOCYTES %: 0 % (ref 0–5)
IMMATURE GRANULOCYTES ABSOLUTE: 0.03 K/UL (ref 0–0.58)
LYMPHOCYTES ABSOLUTE: 3.06 K/UL (ref 1.5–4)
LYMPHOCYTES RELATIVE PERCENT: 36 % (ref 20–42)
MCH RBC QN AUTO: 31.5 PG (ref 26–35)
MCHC RBC AUTO-ENTMCNC: 32.8 G/DL (ref 32–34.5)
MCV RBC AUTO: 96.1 FL (ref 80–99.9)
MONOCYTES ABSOLUTE: 0.6 K/UL (ref 0.1–0.95)
MONOCYTES RELATIVE PERCENT: 7 % (ref 2–12)
NEUTROPHILS ABSOLUTE: 4.58 K/UL (ref 1.8–7.3)
NEUTROPHILS RELATIVE PERCENT: 54 % (ref 43–80)
PDW BLD-RTO: 13.2 % (ref 11.5–15)
PLATELET # BLD: 332 K/UL (ref 130–450)
PMV BLD AUTO: 11 FL (ref 7–12)
POTASSIUM SERPL-SCNC: 4 MMOL/L (ref 3.5–5)
RBC # BLD: 4.41 M/UL (ref 3.5–5.5)
RHEUMATOID FACTOR: <10 IU/ML (ref 0–13)
SED RATE, AUTOMATED: 11 MM/HR (ref 0–20)
SODIUM BLD-SCNC: 140 MMOL/L (ref 132–146)
TOTAL PROTEIN: 7.1 G/DL (ref 6.4–8.3)
TSH SERPL DL<=0.05 MIU/L-ACNC: 4.01 UIU/ML (ref 0.27–4.2)
WBC # BLD: 8.5 K/UL (ref 4.5–11.5)

## 2024-08-29 PROCEDURE — 99204 OFFICE O/P NEW MOD 45 MIN: CPT | Performed by: INTERNAL MEDICINE

## 2024-08-29 ASSESSMENT — ENCOUNTER SYMPTOMS
DIARRHEA: 0
VOMITING: 0
ABDOMINAL PAIN: 0
BACK PAIN: 1
NAUSEA: 0
COLOR CHANGE: 0
TROUBLE SWALLOWING: 0
SHORTNESS OF BREATH: 1
COUGH: 0

## 2024-08-29 NOTE — PATIENT INSTRUCTIONS
I see no obvious signs of underlying systemic autoimmune disease and we may be dealing with fibromyalgia but will need further workup    Talk to PCP about sleep study

## 2024-08-30 LAB
ANA PATTERN: ABNORMAL
ANA TITER: ABNORMAL
ANTI DNA DOUBLE STRANDED: NEGATIVE
ANTI RNP AB: NEGATIVE
ANTI-NUCLEAR ANTIBODY (ANA): POSITIVE
ANTI-SMITH: NEGATIVE
HEPATITIS C ANTIBODY: NONREACTIVE
JO-1 ANTIBODY: NEGATIVE
SCLERODERMA (SCL-70) AB: NEGATIVE
SJOGREN'S ANTIBODIES (SSA): NEGATIVE
SJOGREN'S ANTIBODIES (SSB): NEGATIVE

## 2024-09-02 LAB — BORRELIA BURGDORFERI ABS TOTAL: 0.15 IV

## 2024-09-03 LAB — CCP IGG ANTIBODIES: 0.9 U/ML (ref 0–7)

## 2024-09-10 ENCOUNTER — OFFICE VISIT (OUTPATIENT)
Dept: FAMILY MEDICINE CLINIC | Age: 44
End: 2024-09-10
Payer: COMMERCIAL

## 2024-09-10 VITALS
HEIGHT: 60 IN | BODY MASS INDEX: 38.01 KG/M2 | OXYGEN SATURATION: 98 % | TEMPERATURE: 98 F | DIASTOLIC BLOOD PRESSURE: 76 MMHG | SYSTOLIC BLOOD PRESSURE: 98 MMHG | RESPIRATION RATE: 16 BRPM | HEART RATE: 84 BPM | WEIGHT: 193.6 LBS

## 2024-09-10 DIAGNOSIS — G89.29 OTHER CHRONIC PAIN: ICD-10-CM

## 2024-09-10 DIAGNOSIS — G47.19 EXCESSIVE DAYTIME SLEEPINESS: Primary | ICD-10-CM

## 2024-09-10 DIAGNOSIS — F32.A DEPRESSION, UNSPECIFIED DEPRESSION TYPE: ICD-10-CM

## 2024-09-10 DIAGNOSIS — F51.01 PRIMARY INSOMNIA: ICD-10-CM

## 2024-09-10 PROCEDURE — 99214 OFFICE O/P EST MOD 30 MIN: CPT | Performed by: NURSE PRACTITIONER

## 2024-09-10 RX ORDER — DULOXETIN HYDROCHLORIDE 30 MG/1
30 CAPSULE, DELAYED RELEASE ORAL DAILY
Qty: 30 CAPSULE | Refills: 0 | Status: SHIPPED | OUTPATIENT
Start: 2024-09-10

## 2024-09-10 RX ORDER — ZOLPIDEM TARTRATE 10 MG/1
10 TABLET ORAL NIGHTLY PRN
Qty: 30 TABLET | Refills: 0 | Status: SHIPPED | OUTPATIENT
Start: 2024-09-10 | End: 2025-09-16

## 2024-09-10 ASSESSMENT — ENCOUNTER SYMPTOMS
CONSTIPATION: 0
VOMITING: 0
SHORTNESS OF BREATH: 1
BACK PAIN: 1
WHEEZING: 0
ABDOMINAL PAIN: 1
DIARRHEA: 0
NAUSEA: 1
COUGH: 0

## 2024-09-10 ASSESSMENT — PATIENT HEALTH QUESTIONNAIRE - PHQ9
SUM OF ALL RESPONSES TO PHQ QUESTIONS 1-9: 2
SUM OF ALL RESPONSES TO PHQ QUESTIONS 1-9: 2
2. FEELING DOWN, DEPRESSED OR HOPELESS: SEVERAL DAYS
1. LITTLE INTEREST OR PLEASURE IN DOING THINGS: SEVERAL DAYS
SUM OF ALL RESPONSES TO PHQ QUESTIONS 1-9: 2
SUM OF ALL RESPONSES TO PHQ9 QUESTIONS 1 & 2: 2
SUM OF ALL RESPONSES TO PHQ QUESTIONS 1-9: 2

## 2024-09-13 ENCOUNTER — TELEPHONE (OUTPATIENT)
Dept: SLEEP CENTER | Age: 44
End: 2024-09-13

## 2024-09-24 ENCOUNTER — TELEPHONE (OUTPATIENT)
Dept: SLEEP CENTER | Age: 44
End: 2024-09-24

## 2024-09-26 ENCOUNTER — HOSPITAL ENCOUNTER (OUTPATIENT)
Dept: SLEEP CENTER | Age: 44
Discharge: HOME | End: 2024-09-26
Payer: COMMERCIAL

## 2024-09-26 DIAGNOSIS — G47.19 EXCESSIVE DAYTIME SLEEPINESS: ICD-10-CM

## 2024-09-26 PROCEDURE — 95810 POLYSOM 6/> YRS 4/> PARAM: CPT

## 2024-10-15 DIAGNOSIS — M54.2 NECK PAIN, CHRONIC: ICD-10-CM

## 2024-10-15 DIAGNOSIS — G89.29 NECK PAIN, CHRONIC: ICD-10-CM

## 2024-10-15 DIAGNOSIS — F32.A DEPRESSION, UNSPECIFIED DEPRESSION TYPE: ICD-10-CM

## 2024-10-15 DIAGNOSIS — G89.29 CHRONIC BILATERAL LOW BACK PAIN WITH LEFT-SIDED SCIATICA: ICD-10-CM

## 2024-10-15 DIAGNOSIS — M54.42 CHRONIC BILATERAL LOW BACK PAIN WITH LEFT-SIDED SCIATICA: ICD-10-CM

## 2024-10-15 DIAGNOSIS — F51.01 PRIMARY INSOMNIA: ICD-10-CM

## 2024-10-15 DIAGNOSIS — G89.29 OTHER CHRONIC PAIN: ICD-10-CM

## 2024-10-15 DIAGNOSIS — R11.0 NAUSEA: ICD-10-CM

## 2024-10-15 DIAGNOSIS — K21.9 GASTROESOPHAGEAL REFLUX DISEASE, UNSPECIFIED WHETHER ESOPHAGITIS PRESENT: ICD-10-CM

## 2024-10-15 RX ORDER — PANTOPRAZOLE SODIUM 40 MG/1
TABLET, DELAYED RELEASE ORAL
Qty: 30 TABLET | Refills: 2 | Status: SHIPPED | OUTPATIENT
Start: 2024-10-15

## 2024-10-15 RX ORDER — ZOLPIDEM TARTRATE 10 MG/1
10 TABLET ORAL NIGHTLY PRN
Qty: 30 TABLET | Refills: 0 | Status: SHIPPED | OUTPATIENT
Start: 2024-10-15 | End: 2025-10-21

## 2024-10-15 RX ORDER — DULOXETIN HYDROCHLORIDE 30 MG/1
30 CAPSULE, DELAYED RELEASE ORAL DAILY
Qty: 30 CAPSULE | Refills: 0 | Status: SHIPPED | OUTPATIENT
Start: 2024-10-15

## 2024-10-15 RX ORDER — ONDANSETRON 4 MG/1
TABLET, FILM COATED ORAL
Qty: 20 TABLET | Refills: 0 | Status: SHIPPED | OUTPATIENT
Start: 2024-10-15

## 2024-10-15 RX ORDER — IBUPROFEN 800 MG/1
800 TABLET, FILM COATED ORAL EVERY 8 HOURS PRN
Qty: 90 TABLET | Refills: 0 | Status: SHIPPED | OUTPATIENT
Start: 2024-10-15

## 2024-10-15 NOTE — TELEPHONE ENCOUNTER
Name of Medication(s) Requested:  Requested Prescriptions     Pending Prescriptions Disp Refills    ibuprofen (ADVIL;MOTRIN) 800 MG tablet 90 tablet 0     Sig: Take 1 tablet by mouth every 8 hours as needed for Pain    ondansetron (ZOFRAN) 4 MG tablet 20 tablet 0     Sig: TAKE ONE TABLET BY MOUTH EVERY 8 HOURS AS NEEDED FOR NAUSEA OR VOMITING    pantoprazole (PROTONIX) 40 MG tablet 30 tablet 2     Sig: TAKE ONE TABLET BY MOUTH EVERY DAY    zolpidem (AMBIEN) 10 MG tablet 30 tablet 0     Sig: Take 1 tablet by mouth nightly as needed for Sleep.    DULoxetine (CYMBALTA) 30 MG extended release capsule 30 capsule 0     Sig: Take 1 capsule by mouth daily       Medication is on current medication list Yes    Dosage and directions were verified? Yes    Quantity verified: 30 day supply     Pharmacy Verified?  Yes    Last Appointment:  9/10/2024    Future appts:  No future appointments.     (If no appt send self scheduling link. .REFILLAPPT)  Scheduling request sent?     [] Yes  [x] No    Does patient need updated?  [x] Yes  [] No

## 2024-11-19 ENCOUNTER — OFFICE VISIT (OUTPATIENT)
Dept: FAMILY MEDICINE CLINIC | Age: 44
End: 2024-11-19

## 2024-11-19 VITALS
HEART RATE: 99 BPM | TEMPERATURE: 98.1 F | SYSTOLIC BLOOD PRESSURE: 112 MMHG | BODY MASS INDEX: 37.66 KG/M2 | HEIGHT: 60 IN | RESPIRATION RATE: 16 BRPM | OXYGEN SATURATION: 98 % | DIASTOLIC BLOOD PRESSURE: 70 MMHG | WEIGHT: 191.8 LBS

## 2024-11-19 DIAGNOSIS — G89.29 OTHER CHRONIC PAIN: ICD-10-CM

## 2024-11-19 DIAGNOSIS — R07.9 CHEST PAIN, UNSPECIFIED TYPE: ICD-10-CM

## 2024-11-19 DIAGNOSIS — M67.432 GANGLION CYST OF DORSUM OF LEFT WRIST: ICD-10-CM

## 2024-11-19 DIAGNOSIS — F51.01 PRIMARY INSOMNIA: ICD-10-CM

## 2024-11-19 DIAGNOSIS — E78.2 MIXED HYPERLIPIDEMIA: ICD-10-CM

## 2024-11-19 DIAGNOSIS — R06.09 DOE (DYSPNEA ON EXERTION): ICD-10-CM

## 2024-11-19 DIAGNOSIS — R73.03 PREDIABETES: ICD-10-CM

## 2024-11-19 DIAGNOSIS — R05.1 ACUTE COUGH: Primary | ICD-10-CM

## 2024-11-19 DIAGNOSIS — J06.9 VIRAL URI: ICD-10-CM

## 2024-11-19 DIAGNOSIS — R00.2 PALPITATION: ICD-10-CM

## 2024-11-19 LAB
CHOLESTEROL, TOTAL: 241 MG/DL
HBA1C MFR BLD: 5.6 % (ref 4–5.6)
HDLC SERPL-MCNC: 32 MG/DL
INFLUENZA A ANTIGEN, POC: NEGATIVE
INFLUENZA B ANTIGEN, POC: NEGATIVE
LDL CHOLESTEROL: 162 MG/DL
LOT EXPIRE DATE: NORMAL
LOT KIT NUMBER: NORMAL
SARS-COV-2, POC: NORMAL
TRIGL SERPL-MCNC: 234 MG/DL
VALID INTERNAL CONTROL: NORMAL
VENDOR AND KIT NAME POC: NORMAL
VLDLC SERPL CALC-MCNC: 47 MG/DL

## 2024-11-19 RX ORDER — ZOLPIDEM TARTRATE 10 MG/1
10 TABLET ORAL NIGHTLY PRN
Qty: 30 TABLET | Refills: 0 | Status: SHIPPED | OUTPATIENT
Start: 2024-11-19 | End: 2025-11-25

## 2024-11-19 RX ORDER — DULOXETIN HYDROCHLORIDE 60 MG/1
60 CAPSULE, DELAYED RELEASE ORAL DAILY
Qty: 30 CAPSULE | Refills: 2 | Status: SHIPPED | OUTPATIENT
Start: 2024-11-19

## 2024-11-19 RX ORDER — BROMPHENIRAMINE MALEATE, PSEUDOEPHEDRINE HYDROCHLORIDE, AND DEXTROMETHORPHAN HYDROBROMIDE 2; 30; 10 MG/5ML; MG/5ML; MG/5ML
5 SYRUP ORAL 4 TIMES DAILY PRN
Qty: 473 ML | Refills: 0 | Status: SHIPPED | OUTPATIENT
Start: 2024-11-19

## 2024-11-19 ASSESSMENT — PATIENT HEALTH QUESTIONNAIRE - PHQ9
4. FEELING TIRED OR HAVING LITTLE ENERGY: NEARLY EVERY DAY
5. POOR APPETITE OR OVEREATING: NOT AT ALL
2. FEELING DOWN, DEPRESSED OR HOPELESS: SEVERAL DAYS
8. MOVING OR SPEAKING SO SLOWLY THAT OTHER PEOPLE COULD HAVE NOTICED. OR THE OPPOSITE, BEING SO FIGETY OR RESTLESS THAT YOU HAVE BEEN MOVING AROUND A LOT MORE THAN USUAL: NOT AT ALL
7. TROUBLE CONCENTRATING ON THINGS, SUCH AS READING THE NEWSPAPER OR WATCHING TELEVISION: NEARLY EVERY DAY
10. IF YOU CHECKED OFF ANY PROBLEMS, HOW DIFFICULT HAVE THESE PROBLEMS MADE IT FOR YOU TO DO YOUR WORK, TAKE CARE OF THINGS AT HOME, OR GET ALONG WITH OTHER PEOPLE: VERY DIFFICULT
1. LITTLE INTEREST OR PLEASURE IN DOING THINGS: MORE THAN HALF THE DAYS
SUM OF ALL RESPONSES TO PHQ QUESTIONS 1-9: 12
9. THOUGHTS THAT YOU WOULD BE BETTER OFF DEAD, OR OF HURTING YOURSELF: NOT AT ALL
SUM OF ALL RESPONSES TO PHQ QUESTIONS 1-9: 12
SUM OF ALL RESPONSES TO PHQ9 QUESTIONS 1 & 2: 3
3. TROUBLE FALLING OR STAYING ASLEEP: NEARLY EVERY DAY
SUM OF ALL RESPONSES TO PHQ QUESTIONS 1-9: 12
6. FEELING BAD ABOUT YOURSELF - OR THAT YOU ARE A FAILURE OR HAVE LET YOURSELF OR YOUR FAMILY DOWN: NOT AT ALL
SUM OF ALL RESPONSES TO PHQ QUESTIONS 1-9: 12

## 2024-11-19 ASSESSMENT — ENCOUNTER SYMPTOMS
SINUS PRESSURE: 1
SHORTNESS OF BREATH: 1
NAUSEA: 0
DIARRHEA: 0
VOMITING: 0
WHEEZING: 0
COUGH: 1
CONSTIPATION: 0
BACK PAIN: 1
SINUS PAIN: 1
SORE THROAT: 1

## 2024-11-19 NOTE — PROGRESS NOTES
Venipuncture was obtained from right arm. Patient tolerated the procedure without complications or complaints.  
shoulder/elbow.  This is a chronic pain. Saw rheumatology and had sleep study.  Cymbalta is helping with anger/irritability.  Feels like cymbalta may be helping some. Has episodes of feeling hot all over. Right hand dominant  Cyst left wrist dorsal surface. Onset last week  Complains of sinus congestion, drainage, right ear she can hear herself breathing. Onset 4 days ago and improving some.   Complains of difficulty falling and staying asleep.  Taking ambien prn only with improvement        Review of Systems   Constitutional:  Positive for diaphoresis and fatigue. Negative for activity change, appetite change, chills, fever and unexpected weight change.   HENT:  Positive for congestion, sinus pressure, sinus pain and sore throat (from drainage). Negative for ear pain.    Respiratory:  Positive for cough and shortness of breath. Negative for wheezing.    Cardiovascular:  Positive for chest pain and palpitations.   Gastrointestinal:  Negative for constipation, diarrhea, nausea and vomiting.   Musculoskeletal:  Positive for arthralgias and back pain.   Neurological:  Positive for light-headedness. Negative for weakness and headaches.   Psychiatric/Behavioral:  Positive for dysphoric mood (due to physical limitations) and sleep disturbance. Negative for agitation (improved). The patient is nervous/anxious (less).           Objective   /70   Pulse 99   Temp 98.1 °F (36.7 °C)   Resp 16   Ht 1.524 m (5')   Wt 87 kg (191 lb 12.8 oz)   LMP 11/07/2024   SpO2 98%   BMI 37.46 kg/m²    Physical Exam  Constitutional:       General: She is not in acute distress.     Appearance: Normal appearance. She is well-developed.   HENT:      Head: Normocephalic and atraumatic.      Right Ear: Tympanic membrane, ear canal and external ear normal.      Left Ear: Tympanic membrane, ear canal and external ear normal.      Nose: Congestion present.      Mouth/Throat:      Mouth: Mucous membranes are moist.      Comments: Post 
Abdomen soft, nontender, nondistended, bowel sounds present in all 4 quadrants.

## 2025-01-27 ENCOUNTER — OFFICE VISIT (OUTPATIENT)
Dept: CARDIOLOGY CLINIC | Age: 45
End: 2025-01-27
Payer: COMMERCIAL

## 2025-01-27 VITALS
BODY MASS INDEX: 37.5 KG/M2 | RESPIRATION RATE: 16 BRPM | SYSTOLIC BLOOD PRESSURE: 116 MMHG | DIASTOLIC BLOOD PRESSURE: 82 MMHG | HEART RATE: 93 BPM | WEIGHT: 191 LBS | HEIGHT: 60 IN

## 2025-01-27 DIAGNOSIS — R07.9 CHEST PAIN, UNSPECIFIED TYPE: Primary | ICD-10-CM

## 2025-01-27 DIAGNOSIS — R06.02 SHORTNESS OF BREATH: ICD-10-CM

## 2025-01-27 PROCEDURE — 99204 OFFICE O/P NEW MOD 45 MIN: CPT | Performed by: INTERNAL MEDICINE

## 2025-01-27 PROCEDURE — 93000 ELECTROCARDIOGRAM COMPLETE: CPT | Performed by: INTERNAL MEDICINE

## 2025-01-27 NOTE — PROGRESS NOTES
[facility] Cardiology Progress Note  Dr. Braden Becker      Referring Physician: Martín Cohen DO  CHIEF COMPLAINT:   Chief Complaint   Patient presents with    New Patient    Chest Pain    Shortness of Breath       HISTORY OF PRESENT ILLNESS:   Patient is 44 years old female who is here for evaluation of chest pain and shortness of breath  Chest pain, comes and goes, sharp, last for less than a minute, at rest and with exertion, patient is also complaining of dyspnea on exertion, has been going on for the last couple of months, easy fatigability, occasional palpitations, also come and go, no alleviating or aggravating factors, occasional pedal edema, no PND, no orthopnea, no syncope, no presyncopal episodes.      Past Medical History:   Diagnosis Date    Anxiety     Kidney stone     New onset headache 9/11/2017         Past Surgical History:   Procedure Laterality Date    CYSTOSCOPY  12/12/2016    with right stent/ESWL    KIDNEY STONE REMOVAL  04/19/2024    LITHOTRIPSY      TONSILLECTOMY           Current Outpatient Medications   Medication Sig Dispense Refill    zolpidem (AMBIEN) 10 MG tablet Take 1 tablet by mouth nightly as needed for Sleep. 30 tablet 0    DULoxetine (CYMBALTA) 60 MG extended release capsule Take 1 capsule by mouth daily 30 capsule 2    ibuprofen (ADVIL;MOTRIN) 800 MG tablet Take 1 tablet by mouth every 8 hours as needed for Pain 90 tablet 0    ondansetron (ZOFRAN) 4 MG tablet TAKE ONE TABLET BY MOUTH EVERY 8 HOURS AS NEEDED FOR NAUSEA OR VOMITING 20 tablet 0    pantoprazole (PROTONIX) 40 MG tablet TAKE ONE TABLET BY MOUTH EVERY DAY 30 tablet 2    brompheniramine-pseudoephedrine-DM 2-30-10 MG/5ML syrup Take 5 mLs by mouth 4 times daily as needed for Congestion or Cough (Patient not taking: Reported on 1/27/2025) 473 mL 0     No current facility-administered medications for this visit.         Allergies as of 01/27/2025 - Fully Reviewed 01/27/2025   Allergen Reaction Noted    Codeine Rash

## 2025-01-27 NOTE — PROGRESS NOTES
Patient was in today and had 7 day ZIO placed per Dr. Becker. Patient given instructions, questions answered. Patient verbalized understanding. Monitor # [id number].     THONY Pham, [facility]

## 2025-02-10 ENCOUNTER — HOSPITAL ENCOUNTER (OUTPATIENT)
Dept: CARDIOLOGY | Age: 45
Discharge: HOME | End: 2025-02-12
Attending: INTERNAL MEDICINE
Payer: COMMERCIAL

## 2025-02-10 VITALS — WEIGHT: 191 LBS | BODY MASS INDEX: 37.5 KG/M2 | HEIGHT: 60 IN

## 2025-02-10 DIAGNOSIS — R06.02 SHORTNESS OF BREATH: ICD-10-CM

## 2025-02-10 PROCEDURE — 93306 TTE W/DOPPLER COMPLETE: CPT

## 2025-02-11 ENCOUNTER — TELEPHONE (OUTPATIENT)
Dept: CARDIOLOGY CLINIC | Age: 45
End: 2025-02-11

## 2025-02-11 DIAGNOSIS — R06.02 SHORTNESS OF BREATH: ICD-10-CM

## 2025-02-11 LAB
ECHO AO ASC DIAM: 2.9 CM
ECHO AO ASCENDING AORTA INDEX: 1.58 CM/M2
ECHO AV AREA PEAK VELOCITY: 2 CM2
ECHO AV AREA VTI: 2.1 CM2
ECHO AV AREA/BSA PEAK VELOCITY: 1.1 CM2/M2
ECHO AV AREA/BSA VTI: 1.1 CM2/M2
ECHO AV CUSP MM: 1.9 CM
ECHO AV MEAN GRADIENT: 3 MMHG
ECHO AV MEAN VELOCITY: 0.9 M/S
ECHO AV PEAK GRADIENT: 5 MMHG
ECHO AV PEAK VELOCITY: 1.2 M/S
ECHO AV VELOCITY RATIO: 0.67
ECHO AV VTI: 23.7 CM
ECHO BSA: 1.91 M2
ECHO LA DIAMETER INDEX: 2.02 CM/M2
ECHO LA DIAMETER: 3.7 CM
ECHO LA VOL A-L A2C: 33 ML (ref 22–52)
ECHO LA VOL A-L A4C: 30 ML (ref 22–52)
ECHO LA VOL MOD A2C: 32 ML (ref 22–52)
ECHO LA VOL MOD A4C: 29 ML (ref 22–52)
ECHO LA VOLUME AREA LENGTH: 35 ML
ECHO LA VOLUME INDEX A-L A2C: 18 ML/M2 (ref 16–34)
ECHO LA VOLUME INDEX A-L A4C: 16 ML/M2 (ref 16–34)
ECHO LA VOLUME INDEX AREA LENGTH: 19 ML/M2 (ref 16–34)
ECHO LA VOLUME INDEX MOD A2C: 17 ML/M2 (ref 16–34)
ECHO LA VOLUME INDEX MOD A4C: 16 ML/M2 (ref 16–34)
ECHO LV EF PHYSICIAN: 65 %
ECHO LV FRACTIONAL SHORTENING: 26 % (ref 28–44)
ECHO LV INTERNAL DIMENSION DIASTOLE INDEX: 2.3 CM/M2
ECHO LV INTERNAL DIMENSION DIASTOLIC: 4.2 CM (ref 3.9–5.3)
ECHO LV INTERNAL DIMENSION SYSTOLIC INDEX: 1.69 CM/M2
ECHO LV INTERNAL DIMENSION SYSTOLIC: 3.1 CM
ECHO LV ISOVOLUMETRIC RELAXATION TIME (IVRT): 76.1 MS
ECHO LV IVSD: 1.2 CM (ref 0.6–0.9)
ECHO LV IVSS: 1.5 CM
ECHO LV MASS 2D: 167.4 G (ref 67–162)
ECHO LV MASS INDEX 2D: 91.5 G/M2 (ref 43–95)
ECHO LV POSTERIOR WALL DIASTOLIC: 1.1 CM (ref 0.6–0.9)
ECHO LV POSTERIOR WALL SYSTOLIC: 1.4 CM
ECHO LV RELATIVE WALL THICKNESS RATIO: 0.52
ECHO LVOT AREA: 2.8 CM2
ECHO LVOT AV VTI INDEX: 0.75
ECHO LVOT DIAM: 1.9 CM
ECHO LVOT MEAN GRADIENT: 2 MMHG
ECHO LVOT PEAK GRADIENT: 3 MMHG
ECHO LVOT PEAK VELOCITY: 0.8 M/S
ECHO LVOT STROKE VOLUME INDEX: 27.6 ML/M2
ECHO LVOT SV: 50.4 ML
ECHO LVOT VTI: 17.8 CM
ECHO MV "A" WAVE DURATION: 115.3 MSEC
ECHO MV A VELOCITY: 0.61 M/S
ECHO MV AREA PHT: 4.4 CM2
ECHO MV AREA VTI: 2.8 CM2
ECHO MV E DECELERATION TIME (DT): 218.9 MS
ECHO MV E VELOCITY: 0.75 M/S
ECHO MV E/A RATIO: 1.23
ECHO MV LVOT VTI INDEX: 0.99
ECHO MV MAX VELOCITY: 1 M/S
ECHO MV MEAN GRADIENT: 2 MMHG
ECHO MV MEAN VELOCITY: 0.7 M/S
ECHO MV PEAK GRADIENT: 4 MMHG
ECHO MV PRESSURE HALF TIME (PHT): 49.7 MS
ECHO MV VTI: 17.7 CM
ECHO PV MAX VELOCITY: 0.9 M/S
ECHO PV MEAN GRADIENT: 2 MMHG
ECHO PV MEAN VELOCITY: 0.7 M/S
ECHO PV PEAK GRADIENT: 3 MMHG
ECHO PV VTI: 20.6 CM
ECHO PVEIN A DURATION: 64.6 MS
ECHO PVEIN A VELOCITY: 0.3 M/S
ECHO PVEIN PEAK D VELOCITY: 0.4 M/S
ECHO PVEIN PEAK S VELOCITY: 0.4 M/S
ECHO PVEIN S/D RATIO: 1
ECHO RV INTERNAL DIMENSION: 2.4 CM
ECHO TV REGURGITANT MAX VELOCITY: 1.88 M/S
ECHO TV REGURGITANT PEAK GRADIENT: 14 MMHG

## 2025-02-11 NOTE — TELEPHONE ENCOUNTER
Braden Becker MD McGee, Shelia, MA Hi Shelia:  Would you please let her know that her monitor was okay, 1 episode of rapid heart rate consisted of 5 beats, occasional extra beat nothing to be worried about  Thank you    Called patient left voicemail

## 2025-02-12 ENCOUNTER — TELEPHONE (OUTPATIENT)
Dept: CARDIOLOGY CLINIC | Age: 45
End: 2025-02-12

## 2025-02-12 NOTE — TELEPHONE ENCOUNTER
Braden Becker MD McGee, Shelia, MA Hi Shelia:  Would you please let her know that her echocardiogram was normal  Thank you    Called patient left voicemail

## 2025-03-01 DIAGNOSIS — G89.29 NECK PAIN, CHRONIC: ICD-10-CM

## 2025-03-01 DIAGNOSIS — G89.29 CHRONIC BILATERAL LOW BACK PAIN WITH LEFT-SIDED SCIATICA: ICD-10-CM

## 2025-03-01 DIAGNOSIS — R11.0 NAUSEA: ICD-10-CM

## 2025-03-01 DIAGNOSIS — M54.42 CHRONIC BILATERAL LOW BACK PAIN WITH LEFT-SIDED SCIATICA: ICD-10-CM

## 2025-03-01 DIAGNOSIS — M54.2 NECK PAIN, CHRONIC: ICD-10-CM

## 2025-03-01 DIAGNOSIS — K21.9 GASTROESOPHAGEAL REFLUX DISEASE, UNSPECIFIED WHETHER ESOPHAGITIS PRESENT: ICD-10-CM

## 2025-03-01 DIAGNOSIS — F51.01 PRIMARY INSOMNIA: ICD-10-CM

## 2025-03-01 DIAGNOSIS — G89.29 OTHER CHRONIC PAIN: ICD-10-CM

## 2025-03-03 RX ORDER — ZOLPIDEM TARTRATE 10 MG/1
10 TABLET ORAL NIGHTLY PRN
Qty: 30 TABLET | Refills: 0 | OUTPATIENT
Start: 2025-03-03 | End: 2026-03-09

## 2025-03-03 RX ORDER — PANTOPRAZOLE SODIUM 40 MG/1
TABLET, DELAYED RELEASE ORAL
Qty: 30 TABLET | Refills: 2 | Status: SHIPPED | OUTPATIENT
Start: 2025-03-03

## 2025-03-03 RX ORDER — ONDANSETRON 4 MG/1
TABLET, FILM COATED ORAL
Qty: 20 TABLET | Refills: 0 | OUTPATIENT
Start: 2025-03-03

## 2025-03-03 RX ORDER — DULOXETIN HYDROCHLORIDE 60 MG/1
60 CAPSULE, DELAYED RELEASE ORAL DAILY
Qty: 30 CAPSULE | Refills: 0 | Status: SHIPPED
Start: 2025-03-03 | End: 2025-03-05 | Stop reason: SINTOL

## 2025-03-03 RX ORDER — IBUPROFEN 800 MG/1
800 TABLET, FILM COATED ORAL EVERY 8 HOURS PRN
Qty: 90 TABLET | Refills: 0 | Status: SHIPPED | OUTPATIENT
Start: 2025-03-03

## 2025-03-03 NOTE — TELEPHONE ENCOUNTER
Name of Medication(s) Requested:  Requested Prescriptions     Pending Prescriptions Disp Refills    DULoxetine (CYMBALTA) 60 MG extended release capsule 30 capsule 0     Sig: Take 1 capsule by mouth daily     Refused Prescriptions Disp Refills    zolpidem (AMBIEN) 10 MG tablet 30 tablet 0     Sig: Take 1 tablet by mouth nightly as needed for Sleep.       Medication is on current medication list Yes    Dosage and directions were verified? Yes    Quantity verified: 30 day supply     Pharmacy Verified?  Yes    Last Appointment:  11/19/2024    Future appts:  No future appointments.     (If no appt send self scheduling link. .REFILLAPPT)  Scheduling request sent?     [] Yes  [x] No    Does patient need updated?  [] Yes  [x] No

## 2025-03-03 NOTE — TELEPHONE ENCOUNTER
Name of Medication(s) Requested:  Requested Prescriptions     Pending Prescriptions Disp Refills    ibuprofen (ADVIL;MOTRIN) 800 MG tablet 90 tablet 0     Sig: Take 1 tablet by mouth every 8 hours as needed for Pain    pantoprazole (PROTONIX) 40 MG tablet 30 tablet 2     Sig: TAKE ONE TABLET BY MOUTH EVERY DAY     Refused Prescriptions Disp Refills    ondansetron (ZOFRAN) 4 MG tablet 20 tablet 0     Sig: TAKE ONE TABLET BY MOUTH EVERY 8 HOURS AS NEEDED FOR NAUSEA OR VOMITING       Medication is on current medication list Yes    Dosage and directions were verified? Yes    Quantity verified: 90 day supply     Pharmacy Verified?  Yes    Last Appointment:  Visit date not found    Future appts:  No future appointments.     (If no appt send self scheduling link. .REFILLAPPT)  Scheduling request sent?     [] Yes  [x] No    Does patient need updated?  [] Yes  [x] No

## 2025-03-05 ENCOUNTER — OFFICE VISIT (OUTPATIENT)
Dept: FAMILY MEDICINE CLINIC | Age: 45
End: 2025-03-05

## 2025-03-05 VITALS
BODY MASS INDEX: 38.64 KG/M2 | HEIGHT: 60 IN | WEIGHT: 196.8 LBS | HEART RATE: 100 BPM | SYSTOLIC BLOOD PRESSURE: 110 MMHG | DIASTOLIC BLOOD PRESSURE: 70 MMHG | TEMPERATURE: 97.8 F | OXYGEN SATURATION: 98 % | RESPIRATION RATE: 16 BRPM

## 2025-03-05 DIAGNOSIS — G89.29 CHRONIC BILATERAL LOW BACK PAIN WITH LEFT-SIDED SCIATICA: ICD-10-CM

## 2025-03-05 DIAGNOSIS — M54.42 CHRONIC BILATERAL LOW BACK PAIN WITH LEFT-SIDED SCIATICA: ICD-10-CM

## 2025-03-05 DIAGNOSIS — G89.29 OTHER CHRONIC PAIN: Primary | ICD-10-CM

## 2025-03-05 DIAGNOSIS — F51.01 PRIMARY INSOMNIA: ICD-10-CM

## 2025-03-05 RX ORDER — ZOLPIDEM TARTRATE 10 MG/1
10 TABLET ORAL NIGHTLY PRN
Qty: 30 TABLET | Refills: 0 | Status: SHIPPED | OUTPATIENT
Start: 2025-03-05 | End: 2026-03-11

## 2025-03-05 RX ORDER — PREGABALIN 50 MG/1
50 CAPSULE ORAL 2 TIMES DAILY
Qty: 60 CAPSULE | Refills: 0 | Status: SHIPPED | OUTPATIENT
Start: 2025-03-05 | End: 2025-04-04

## 2025-03-05 RX ORDER — DULOXETIN HYDROCHLORIDE 30 MG/1
30 CAPSULE, DELAYED RELEASE ORAL DAILY
Qty: 30 CAPSULE | Refills: 0 | Status: SHIPPED | OUTPATIENT
Start: 2025-03-05

## 2025-03-05 SDOH — ECONOMIC STABILITY: FOOD INSECURITY: WITHIN THE PAST 12 MONTHS, THE FOOD YOU BOUGHT JUST DIDN'T LAST AND YOU DIDN'T HAVE MONEY TO GET MORE.: NEVER TRUE

## 2025-03-05 SDOH — ECONOMIC STABILITY: FOOD INSECURITY: WITHIN THE PAST 12 MONTHS, YOU WORRIED THAT YOUR FOOD WOULD RUN OUT BEFORE YOU GOT MONEY TO BUY MORE.: NEVER TRUE

## 2025-03-05 ASSESSMENT — PATIENT HEALTH QUESTIONNAIRE - PHQ9
10. IF YOU CHECKED OFF ANY PROBLEMS, HOW DIFFICULT HAVE THESE PROBLEMS MADE IT FOR YOU TO DO YOUR WORK, TAKE CARE OF THINGS AT HOME, OR GET ALONG WITH OTHER PEOPLE: VERY DIFFICULT
8. MOVING OR SPEAKING SO SLOWLY THAT OTHER PEOPLE COULD HAVE NOTICED. OR THE OPPOSITE, BEING SO FIGETY OR RESTLESS THAT YOU HAVE BEEN MOVING AROUND A LOT MORE THAN USUAL: NOT AT ALL
7. TROUBLE CONCENTRATING ON THINGS, SUCH AS READING THE NEWSPAPER OR WATCHING TELEVISION: NEARLY EVERY DAY
5. POOR APPETITE OR OVEREATING: NOT AT ALL
SUM OF ALL RESPONSES TO PHQ QUESTIONS 1-9: 16
4. FEELING TIRED OR HAVING LITTLE ENERGY: NEARLY EVERY DAY
6. FEELING BAD ABOUT YOURSELF - OR THAT YOU ARE A FAILURE OR HAVE LET YOURSELF OR YOUR FAMILY DOWN: SEVERAL DAYS
SUM OF ALL RESPONSES TO PHQ QUESTIONS 1-9: 16
9. THOUGHTS THAT YOU WOULD BE BETTER OFF DEAD, OR OF HURTING YOURSELF: NOT AT ALL
1. LITTLE INTEREST OR PLEASURE IN DOING THINGS: NEARLY EVERY DAY
2. FEELING DOWN, DEPRESSED OR HOPELESS: NEARLY EVERY DAY
3. TROUBLE FALLING OR STAYING ASLEEP: NEARLY EVERY DAY
SUM OF ALL RESPONSES TO PHQ QUESTIONS 1-9: 16
SUM OF ALL RESPONSES TO PHQ QUESTIONS 1-9: 16

## 2025-03-05 NOTE — PROGRESS NOTES
appetite but does report unexplained fatigue. She experiences shortness of breath at rest and during exertion, occasional chest pain and palpitations, nausea, joint pain, back pain, muscle aches, headaches, lightheadedness, anxiety, depression, and sleep disturbances. She does not endorse any thoughts of self-harm. She believes that her depression is secondary to her chronic pain, and her anxiety is exacerbated by past events. She has previously sought counseling for her anxiety and depression. She finds that Cymbalta provides some relief from her anxiety symptoms. She reports that discontinuation of Cymbalta resulted in dizziness.    She has gained weight.    ALLERGIES  The patient is allergic to CODEINE.    MEDICATIONS  Cymbalta, ibuprofen, Zofran, Protonix, Ambien      Review of Systems       Objective   /70   Pulse 100   Temp 97.8 °F (36.6 °C)   Resp 16   Ht 1.524 m (5')   Wt 89.3 kg (196 lb 12.8 oz)   LMP 02/06/2025 (Exact Date)   SpO2 98%   BMI 38.43 kg/m²      Results      Physical Exam  Constitutional:       General: She is not in acute distress.     Appearance: Normal appearance. She is well-developed.   HENT:      Head: Normocephalic and atraumatic.   Neck:      Thyroid: No thyromegaly.      Trachea: No tracheal deviation.   Cardiovascular:      Rate and Rhythm: Normal rate and regular rhythm.      Heart sounds: Normal heart sounds. No murmur heard.  Pulmonary:      Effort: Pulmonary effort is normal. No respiratory distress.      Breath sounds: Normal breath sounds. No wheezing or rales.   Chest:      Chest wall: No tenderness.   Abdominal:      General: Bowel sounds are normal.      Palpations: Abdomen is soft.      Tenderness: There is no abdominal tenderness.   Musculoskeletal:         General: Tenderness present.   Lymphadenopathy:      Cervical: No cervical adenopathy.   Skin:     General: Skin is warm and dry.   Neurological:      Mental Status: She is alert and oriented to person,

## 2025-03-28 ENCOUNTER — PATIENT MESSAGE (OUTPATIENT)
Dept: FAMILY MEDICINE CLINIC | Age: 45
End: 2025-03-28

## 2025-04-29 DIAGNOSIS — R11.0 NAUSEA: ICD-10-CM

## 2025-04-29 DIAGNOSIS — F51.01 PRIMARY INSOMNIA: ICD-10-CM

## 2025-04-30 RX ORDER — ZOLPIDEM TARTRATE 10 MG/1
10 TABLET ORAL NIGHTLY PRN
Qty: 30 TABLET | Refills: 0 | Status: SHIPPED | OUTPATIENT
Start: 2025-04-30 | End: 2026-05-06

## 2025-04-30 RX ORDER — ONDANSETRON 4 MG/1
TABLET, FILM COATED ORAL
Qty: 20 TABLET | Refills: 0 | Status: SHIPPED | OUTPATIENT
Start: 2025-04-30

## 2025-04-30 NOTE — TELEPHONE ENCOUNTER
Name of Medication(s) Requested:  Requested Prescriptions     Pending Prescriptions Disp Refills    zolpidem (AMBIEN) 10 MG tablet 30 tablet 0     Sig: Take 1 tablet by mouth nightly as needed for Sleep.       Medication is on current medication list Yes    Dosage and directions were verified? Yes    Quantity verified: 30 day supply     Pharmacy Verified?  Yes    Last Appointment:  3/5/2025    Future appts:  No future appointments.     (If no appt send self scheduling link. .REFILLAPPT)  Scheduling request sent?     [] Yes  [x] No    Does patient need updated?  [] Yes  [x] No

## 2025-04-30 NOTE — TELEPHONE ENCOUNTER
Name of Medication(s) Requested:  Requested Prescriptions     Pending Prescriptions Disp Refills    ondansetron (ZOFRAN) 4 MG tablet 20 tablet 0     Sig: TAKE ONE TABLET BY MOUTH EVERY 8 HOURS AS NEEDED FOR NAUSEA OR VOMITING       Medication is on current medication list Yes    Dosage and directions were verified? Yes    Quantity verified: 30 day supply     Pharmacy Verified?  Yes    Last Appointment:  Visit date not found    Future appts:  No future appointments.     (If no appt send self scheduling link. .REFILLAPPT)  Scheduling request sent?     [] Yes  [x] No    Does patient need updated?  [] Yes  [x] No

## 2025-05-28 DIAGNOSIS — F51.01 PRIMARY INSOMNIA: ICD-10-CM

## 2025-05-28 DIAGNOSIS — R11.0 NAUSEA: ICD-10-CM

## 2025-05-29 RX ORDER — ZOLPIDEM TARTRATE 10 MG/1
10 TABLET ORAL NIGHTLY PRN
Qty: 30 TABLET | Refills: 0 | Status: SHIPPED | OUTPATIENT
Start: 2025-05-29 | End: 2026-06-04

## 2025-05-29 RX ORDER — ONDANSETRON 4 MG/1
TABLET, FILM COATED ORAL
Qty: 20 TABLET | Refills: 0 | Status: SHIPPED | OUTPATIENT
Start: 2025-05-29

## 2025-05-29 NOTE — TELEPHONE ENCOUNTER
Name of Medication(s) Requested:  Requested Prescriptions     Pending Prescriptions Disp Refills    zolpidem (AMBIEN) 10 MG tablet 30 tablet 0     Sig: Take 1 tablet by mouth nightly as needed for Sleep.    ondansetron (ZOFRAN) 4 MG tablet 20 tablet 0     Sig: TAKE ONE TABLET BY MOUTH EVERY 8 HOURS AS NEEDED FOR NAUSEA OR VOMITING       Medication is on current medication list Yes    Dosage and directions were verified? Yes    Quantity verified: 30 day supply     Pharmacy Verified?  Yes    Last Appointment:  3/5/2025    Future appts:  No future appointments.     (If no appt send self scheduling link. .REFILLAPPT)  Scheduling request sent?     [] Yes  [x] No    Does patient need updated?  [] Yes  [x] No

## 2025-07-21 DIAGNOSIS — F51.01 PRIMARY INSOMNIA: ICD-10-CM

## 2025-07-21 DIAGNOSIS — M54.2 NECK PAIN, CHRONIC: ICD-10-CM

## 2025-07-21 DIAGNOSIS — G89.29 NECK PAIN, CHRONIC: ICD-10-CM

## 2025-07-21 DIAGNOSIS — M54.42 CHRONIC BILATERAL LOW BACK PAIN WITH LEFT-SIDED SCIATICA: ICD-10-CM

## 2025-07-21 DIAGNOSIS — G89.29 CHRONIC BILATERAL LOW BACK PAIN WITH LEFT-SIDED SCIATICA: ICD-10-CM

## 2025-07-21 DIAGNOSIS — R11.0 NAUSEA: ICD-10-CM

## 2025-07-22 RX ORDER — ONDANSETRON 4 MG/1
TABLET, FILM COATED ORAL
Qty: 20 TABLET | Refills: 0 | Status: SHIPPED | OUTPATIENT
Start: 2025-07-22

## 2025-07-22 RX ORDER — IBUPROFEN 800 MG/1
800 TABLET, FILM COATED ORAL EVERY 8 HOURS PRN
Qty: 90 TABLET | Refills: 0 | Status: SHIPPED | OUTPATIENT
Start: 2025-07-22

## 2025-07-22 RX ORDER — ZOLPIDEM TARTRATE 10 MG/1
10 TABLET ORAL NIGHTLY PRN
Qty: 30 TABLET | Refills: 0 | OUTPATIENT
Start: 2025-07-22 | End: 2026-07-28

## 2025-07-22 NOTE — TELEPHONE ENCOUNTER
Name of Medication(s) Requested:  Requested Prescriptions     Pending Prescriptions Disp Refills    ibuprofen (ADVIL;MOTRIN) 800 MG tablet 90 tablet 0     Sig: Take 1 tablet by mouth every 8 hours as needed for Pain       Medication is on current medication list Yes    Dosage and directions were verified? Yes    Quantity verified: 30 day supply     Pharmacy Verified?  Yes    Last Appointment:  10/18/2023    Future appts:  No future appointments.     (If no appt send self scheduling link. .REFILLAPPT)  Scheduling request sent?     [] Yes  [x] No    Does patient need updated?  [] Yes  [x] No

## 2025-07-22 NOTE — TELEPHONE ENCOUNTER
Name of Medication(s) Requested:  Requested Prescriptions     Pending Prescriptions Disp Refills    ondansetron (ZOFRAN) 4 MG tablet 20 tablet 0     Sig: TAKE ONE TABLET BY MOUTH EVERY 8 HOURS AS NEEDED FOR NAUSEA OR VOMITING     Refused Prescriptions Disp Refills    zolpidem (AMBIEN) 10 MG tablet 30 tablet 0     Sig: Take 1 tablet by mouth nightly as needed for Sleep.       Medication is on current medication list Yes    Dosage and directions were verified? Yes    Quantity verified: 30 day supply     Pharmacy Verified?  Yes    Last Appointment:  Visit date not found    Future appts:  No future appointments.     (If no appt send self scheduling link. .REFILLAPPT)  Scheduling request sent?     [] Yes  [x] No    Does patient need updated?  [] Yes  [x] No

## 2025-08-20 ENCOUNTER — OFFICE VISIT (OUTPATIENT)
Dept: FAMILY MEDICINE CLINIC | Age: 45
End: 2025-08-20
Payer: COMMERCIAL

## 2025-08-20 VITALS
HEART RATE: 79 BPM | OXYGEN SATURATION: 98 % | RESPIRATION RATE: 16 BRPM | DIASTOLIC BLOOD PRESSURE: 84 MMHG | HEIGHT: 60 IN | SYSTOLIC BLOOD PRESSURE: 110 MMHG | TEMPERATURE: 97.6 F | BODY MASS INDEX: 38.43 KG/M2

## 2025-08-20 DIAGNOSIS — F51.01 PRIMARY INSOMNIA: ICD-10-CM

## 2025-08-20 DIAGNOSIS — J02.9 SORE THROAT: ICD-10-CM

## 2025-08-20 DIAGNOSIS — R06.02 SOB (SHORTNESS OF BREATH) ON EXERTION: ICD-10-CM

## 2025-08-20 DIAGNOSIS — J32.9 CHRONIC SINUSITIS, UNSPECIFIED LOCATION: Primary | ICD-10-CM

## 2025-08-20 LAB
INFLUENZA A ANTIGEN, POC: NEGATIVE
INFLUENZA B ANTIGEN, POC: NEGATIVE
LOT EXPIRE DATE: NORMAL
LOT KIT NUMBER: NORMAL
S PYO AG THROAT QL: NORMAL
SARS-COV-2, POC: NORMAL
VALID INTERNAL CONTROL: NORMAL
VENDOR AND KIT NAME POC: NORMAL

## 2025-08-20 PROCEDURE — 87428 SARSCOV & INF VIR A&B AG IA: CPT | Performed by: NURSE PRACTITIONER

## 2025-08-20 PROCEDURE — 99214 OFFICE O/P EST MOD 30 MIN: CPT | Performed by: NURSE PRACTITIONER

## 2025-08-20 PROCEDURE — 87880 STREP A ASSAY W/OPTIC: CPT | Performed by: NURSE PRACTITIONER

## 2025-08-20 RX ORDER — DIAZEPAM 5 MG/1
TABLET ORAL
COMMUNITY
Start: 2025-05-28 | End: 2025-08-20

## 2025-08-20 RX ORDER — FLUTICASONE PROPIONATE 50 MCG
2 SPRAY, SUSPENSION (ML) NASAL DAILY
COMMUNITY

## 2025-08-20 RX ORDER — AZELASTINE 1 MG/ML
2 SPRAY, METERED NASAL 2 TIMES DAILY
Qty: 120 ML | Refills: 1 | Status: SHIPPED | OUTPATIENT
Start: 2025-08-20

## 2025-08-20 RX ORDER — LEVONORGESTREL AND ETHINYL ESTRADIOL 0.15-0.03
1 KIT ORAL DAILY
COMMUNITY
Start: 2025-08-06 | End: 2025-09-03

## 2025-08-20 RX ORDER — ZOLPIDEM TARTRATE 10 MG/1
10 TABLET ORAL NIGHTLY PRN
Qty: 30 TABLET | Refills: 0 | Status: SHIPPED | OUTPATIENT
Start: 2025-08-20 | End: 2026-08-26

## 2025-08-20 ASSESSMENT — PATIENT HEALTH QUESTIONNAIRE - PHQ9
SUM OF ALL RESPONSES TO PHQ QUESTIONS 1-9: 0
1. LITTLE INTEREST OR PLEASURE IN DOING THINGS: NOT AT ALL
SUM OF ALL RESPONSES TO PHQ QUESTIONS 1-9: 0
2. FEELING DOWN, DEPRESSED OR HOPELESS: NOT AT ALL

## 2025-09-02 ENCOUNTER — HOSPITAL ENCOUNTER (OUTPATIENT)
Dept: MAMMOGRAPHY | Age: 45
Discharge: HOME | End: 2025-09-04
Payer: COMMERCIAL

## 2025-09-02 DIAGNOSIS — Z12.31 SCREENING MAMMOGRAM FOR BREAST CANCER: ICD-10-CM

## 2025-09-02 PROCEDURE — 77067 SCR MAMMO BI INCL CAD: CPT

## 2025-09-03 ENCOUNTER — OFFICE VISIT (OUTPATIENT)
Age: 45
End: 2025-09-03
Payer: COMMERCIAL

## 2025-09-03 ENCOUNTER — CLINICAL DOCUMENTATION (OUTPATIENT)
Dept: MAMMOGRAPHY | Age: 45
End: 2025-09-03

## 2025-09-03 VITALS
SYSTOLIC BLOOD PRESSURE: 124 MMHG | OXYGEN SATURATION: 98 % | TEMPERATURE: 97.6 F | RESPIRATION RATE: 17 BRPM | HEART RATE: 89 BPM | HEIGHT: 60 IN | DIASTOLIC BLOOD PRESSURE: 86 MMHG | BODY MASS INDEX: 38.43 KG/M2

## 2025-09-03 DIAGNOSIS — R06.02 SHORTNESS OF BREATH: Primary | ICD-10-CM

## 2025-09-03 PROCEDURE — 99204 OFFICE O/P NEW MOD 45 MIN: CPT | Performed by: NURSE PRACTITIONER

## 2025-09-03 RX ORDER — ALBUTEROL SULFATE 90 UG/1
2 INHALANT RESPIRATORY (INHALATION) EVERY 6 HOURS PRN
Qty: 18 G | Refills: 3 | Status: SHIPPED | OUTPATIENT
Start: 2025-09-03